# Patient Record
Sex: FEMALE | Race: WHITE | NOT HISPANIC OR LATINO | Employment: OTHER | URBAN - METROPOLITAN AREA
[De-identification: names, ages, dates, MRNs, and addresses within clinical notes are randomized per-mention and may not be internally consistent; named-entity substitution may affect disease eponyms.]

---

## 2020-11-25 PROCEDURE — U0004 COV-19 TEST NON-CDC HGH THRU: HCPCS | Performed by: NURSE PRACTITIONER

## 2021-06-22 ENCOUNTER — LAB (OUTPATIENT)
Dept: LAB | Facility: HOSPITAL | Age: 56
End: 2021-06-22

## 2021-06-22 ENCOUNTER — OFFICE VISIT (OUTPATIENT)
Dept: INTERNAL MEDICINE | Facility: CLINIC | Age: 56
End: 2021-06-22

## 2021-06-22 ENCOUNTER — HOSPITAL ENCOUNTER (OUTPATIENT)
Dept: GENERAL RADIOLOGY | Facility: HOSPITAL | Age: 56
Discharge: HOME | End: 2021-06-22

## 2021-06-22 VITALS
WEIGHT: 293 LBS | SYSTOLIC BLOOD PRESSURE: 148 MMHG | TEMPERATURE: 97.8 F | DIASTOLIC BLOOD PRESSURE: 88 MMHG | BODY MASS INDEX: 44.41 KG/M2 | RESPIRATION RATE: 20 BRPM | HEIGHT: 68 IN | OXYGEN SATURATION: 96 % | HEART RATE: 63 BPM

## 2021-06-22 DIAGNOSIS — M25.561 ACUTE PAIN OF RIGHT KNEE: Primary | ICD-10-CM

## 2021-06-22 DIAGNOSIS — F50.81 BINGE EATING DISORDER: ICD-10-CM

## 2021-06-22 DIAGNOSIS — M25.551 HIP PAIN, RIGHT: ICD-10-CM

## 2021-06-22 DIAGNOSIS — R73.09 ABNORMAL GLUCOSE: ICD-10-CM

## 2021-06-22 DIAGNOSIS — R74.8 ELEVATED ALKALINE PHOSPHATASE LEVEL: ICD-10-CM

## 2021-06-22 DIAGNOSIS — Z91.89 AT HIGH RISK FOR BREAST CANCER: ICD-10-CM

## 2021-06-22 DIAGNOSIS — J30.9 ALLERGIC RHINITIS, UNSPECIFIED SEASONALITY, UNSPECIFIED TRIGGER: ICD-10-CM

## 2021-06-22 DIAGNOSIS — M25.561 ACUTE PAIN OF RIGHT KNEE: ICD-10-CM

## 2021-06-22 DIAGNOSIS — E78.2 MIXED HYPERLIPIDEMIA: ICD-10-CM

## 2021-06-22 DIAGNOSIS — F33.41 RECURRENT MAJOR DEPRESSIVE DISORDER, IN PARTIAL REMISSION (HCC): ICD-10-CM

## 2021-06-22 DIAGNOSIS — I10 ESSENTIAL HYPERTENSION: ICD-10-CM

## 2021-06-22 DIAGNOSIS — F41.9 ANXIETY: ICD-10-CM

## 2021-06-22 DIAGNOSIS — N28.9 ABNORMAL KIDNEY FUNCTION: ICD-10-CM

## 2021-06-22 DIAGNOSIS — R61 NIGHT SWEAT: ICD-10-CM

## 2021-06-22 DIAGNOSIS — E03.9 ACQUIRED HYPOTHYROIDISM: ICD-10-CM

## 2021-06-22 DIAGNOSIS — R19.7 DIARRHEA, UNSPECIFIED TYPE: ICD-10-CM

## 2021-06-22 PROBLEM — E53.8 VITAMIN B12 DEFICIENCY (NON ANEMIC): Status: ACTIVE | Noted: 2018-05-08

## 2021-06-22 PROBLEM — E66.01 MORBID OBESITY: Status: ACTIVE | Noted: 2017-01-30

## 2021-06-22 PROBLEM — F41.0 PANIC DISORDER: Status: ACTIVE | Noted: 2019-12-03

## 2021-06-22 PROBLEM — F50.819 BINGE EATING DISORDER: Status: ACTIVE | Noted: 2021-06-22

## 2021-06-22 PROBLEM — E78.5 HYPERLIPIDEMIA: Status: ACTIVE | Noted: 2017-01-30

## 2021-06-22 PROBLEM — E55.9 VITAMIN D DEFICIENCY: Status: ACTIVE | Noted: 2018-05-08

## 2021-06-22 PROBLEM — M54.16 LUMBAR RADICULOPATHY: Status: ACTIVE | Noted: 2017-01-30

## 2021-06-22 LAB
ALBUMIN SERPL-MCNC: 4.5 G/DL (ref 3.5–5.2)
ALBUMIN/GLOB SERPL: 1.7 G/DL
ALP SERPL-CCNC: 149 U/L (ref 39–117)
ALT SERPL W P-5'-P-CCNC: 22 U/L (ref 1–33)
ANION GAP SERPL CALCULATED.3IONS-SCNC: 10.6 MMOL/L (ref 5–15)
AST SERPL-CCNC: 20 U/L (ref 1–32)
BASOPHILS # BLD AUTO: 0.07 10*3/MM3 (ref 0–0.2)
BASOPHILS NFR BLD AUTO: 0.7 % (ref 0–1.5)
BILIRUB SERPL-MCNC: <0.2 MG/DL (ref 0–1.2)
BUN SERPL-MCNC: 11 MG/DL (ref 6–20)
BUN/CREAT SERPL: 8.8 (ref 7–25)
CALCIUM SPEC-SCNC: 9.3 MG/DL (ref 8.6–10.5)
CHLORIDE SERPL-SCNC: 106 MMOL/L (ref 98–107)
CHOLEST SERPL-MCNC: 222 MG/DL (ref 0–200)
CO2 SERPL-SCNC: 24.4 MMOL/L (ref 22–29)
CREAT SERPL-MCNC: 1.25 MG/DL (ref 0.57–1)
DEPRECATED RDW RBC AUTO: 46.1 FL (ref 37–54)
EOSINOPHIL # BLD AUTO: 0.21 10*3/MM3 (ref 0–0.4)
EOSINOPHIL NFR BLD AUTO: 2 % (ref 0.3–6.2)
ERYTHROCYTE [DISTWIDTH] IN BLOOD BY AUTOMATED COUNT: 14.4 % (ref 12.3–15.4)
GFR SERPL CREATININE-BSD FRML MDRD: 44 ML/MIN/1.73
GLOBULIN UR ELPH-MCNC: 2.7 GM/DL
GLUCOSE SERPL-MCNC: 107 MG/DL (ref 65–99)
HCT VFR BLD AUTO: 41 % (ref 34–46.6)
HDLC SERPL-MCNC: 36 MG/DL (ref 40–60)
HGB BLD-MCNC: 13.5 G/DL (ref 12–15.9)
IMM GRANULOCYTES # BLD AUTO: 0.05 10*3/MM3 (ref 0–0.05)
IMM GRANULOCYTES NFR BLD AUTO: 0.5 % (ref 0–0.5)
LDLC SERPL CALC-MCNC: 150 MG/DL (ref 0–100)
LDLC/HDLC SERPL: 4.07 {RATIO}
LYMPHOCYTES # BLD AUTO: 1.96 10*3/MM3 (ref 0.7–3.1)
LYMPHOCYTES NFR BLD AUTO: 19 % (ref 19.6–45.3)
MCH RBC QN AUTO: 28.8 PG (ref 26.6–33)
MCHC RBC AUTO-ENTMCNC: 32.9 G/DL (ref 31.5–35.7)
MCV RBC AUTO: 87.6 FL (ref 79–97)
MONOCYTES # BLD AUTO: 0.48 10*3/MM3 (ref 0.1–0.9)
MONOCYTES NFR BLD AUTO: 4.7 % (ref 5–12)
NEUTROPHILS NFR BLD AUTO: 7.54 10*3/MM3 (ref 1.7–7)
NEUTROPHILS NFR BLD AUTO: 73.1 % (ref 42.7–76)
NRBC BLD AUTO-RTO: 0 /100 WBC (ref 0–0.2)
PLATELET # BLD AUTO: 382 10*3/MM3 (ref 140–450)
PMV BLD AUTO: 10.7 FL (ref 6–12)
POTASSIUM SERPL-SCNC: 4.6 MMOL/L (ref 3.5–5.2)
PROT SERPL-MCNC: 7.2 G/DL (ref 6–8.5)
RBC # BLD AUTO: 4.68 10*6/MM3 (ref 3.77–5.28)
SODIUM SERPL-SCNC: 141 MMOL/L (ref 136–145)
TRIGL SERPL-MCNC: 198 MG/DL (ref 0–150)
TSH SERPL DL<=0.05 MIU/L-ACNC: 1.64 UIU/ML (ref 0.27–4.2)
VLDLC SERPL-MCNC: 36 MG/DL (ref 5–40)
WBC # BLD AUTO: 10.31 10*3/MM3 (ref 3.4–10.8)

## 2021-06-22 PROCEDURE — 36415 COLL VENOUS BLD VENIPUNCTURE: CPT

## 2021-06-22 PROCEDURE — 80061 LIPID PANEL: CPT | Performed by: PHYSICIAN ASSISTANT

## 2021-06-22 PROCEDURE — 99214 OFFICE O/P EST MOD 30 MIN: CPT | Performed by: PHYSICIAN ASSISTANT

## 2021-06-22 PROCEDURE — 73560 X-RAY EXAM OF KNEE 1 OR 2: CPT

## 2021-06-22 PROCEDURE — 80053 COMPREHEN METABOLIC PANEL: CPT | Performed by: PHYSICIAN ASSISTANT

## 2021-06-22 PROCEDURE — 73502 X-RAY EXAM HIP UNI 2-3 VIEWS: CPT

## 2021-06-22 PROCEDURE — 85025 COMPLETE CBC W/AUTO DIFF WBC: CPT | Performed by: PHYSICIAN ASSISTANT

## 2021-06-22 PROCEDURE — 84443 ASSAY THYROID STIM HORMONE: CPT | Performed by: PHYSICIAN ASSISTANT

## 2021-06-22 PROCEDURE — 82977 ASSAY OF GGT: CPT

## 2021-06-22 RX ORDER — LEVOTHYROXINE SODIUM 0.15 MG/1
150 TABLET ORAL DAILY
Qty: 90 TABLET | Refills: 2 | Status: SHIPPED | OUTPATIENT
Start: 2021-06-22 | End: 2021-12-15 | Stop reason: SDUPTHER

## 2021-06-22 RX ORDER — FLUTICASONE PROPIONATE 50 MCG
2 SPRAY, SUSPENSION (ML) NASAL DAILY
COMMUNITY

## 2021-06-22 RX ORDER — VENLAFAXINE HYDROCHLORIDE 150 MG/1
CAPSULE, EXTENDED RELEASE ORAL
COMMUNITY
End: 2022-10-19 | Stop reason: SDUPTHER

## 2021-06-22 RX ORDER — PROPRANOLOL HYDROCHLORIDE 10 MG/1
10 TABLET ORAL 2 TIMES DAILY
COMMUNITY

## 2021-06-22 RX ORDER — PROPRANOLOL HYDROCHLORIDE 160 MG/1
160 CAPSULE, EXTENDED RELEASE ORAL
Qty: 90 EACH | Refills: 2 | Status: SHIPPED | OUTPATIENT
Start: 2021-06-22 | End: 2021-12-13 | Stop reason: SDUPTHER

## 2021-06-22 NOTE — PROGRESS NOTES
Chief Complaint  Establish Care, Anxiety (anxiety / depression ), Joint Pain in lower extremities , and Labs Only (night sweats, diarreah and nausea)    Subjective          History of Present Illness  Jessica Ugalde presents to [facility] to establish care.  Right Knee Pain:  Has been going on for the last 4 weeks, does not recall injuring her knee, no twisting steps or falls. Does not swell up or turn red. No fhx of RA but has fhx of OA. Has starting having hip pain in the right hip now as well. Having difficulty lifting her leg to put on clothing, difficulty with walking up stairs. Has hx of mild lower back pain with right sided sciatica but this pain feels different.     Diarrhea:  Has had it for a month now. Wakes up feeling nauseated and will have 3-4 episodes of diarrhea. No vomiting but has decreased appetite. Sx ease off through the day, no constipation. Has stomach cramps with it. No blood in stools or fevers. Last colonoscopy was 10 yrs ago. Cousin dx with colon CA in mid 30s.    Night sweats:  Waking up drenched a few nights most nights for the last 3 weeks, prior to that they were sporadically. No hot flashes during the day.  Had hysterectomy in 1999, was put on hormone therapy but stopped them due to fhx of breast CA. Does not have gyn, does not need paps, hysterectomy was complete.    Fhx Breast CA:  Last mammogram late last year, was normal, not due for another one yet.     Anx/Dep:  Takes propranolol 10mg prn for anxiety. Sees therapist through [facility] through  and sees a  there. Has PTSD and dep, anx, binge eating d/o. Taking Vyvanse for binge eating but not sure if she feels like it is helping. Sees therapist every week and Psych Dr monthly. Takes bupropion, has helped depression.  Is on remeron for depression and insomnia. Takes effexor as well. Psych manages her psych meds, Dr Chela Gallagher.    Hypothyroid:  Has been well controled on synthroid 150 for a  long time. Last TSH in the last year was normal.     HTN:  Controlled on propranolol LA 160mg, checks BP at home.  No CP/SOA. No hx of cardiac work up before, no echo/stress test. PGF  age 65 of AMI.       Review of Systems   Constitutional: Negative for fatigue, fever and unexpected weight loss.   Respiratory: Negative for cough, shortness of breath and wheezing.    Cardiovascular: Negative for chest pain and palpitations.   Gastrointestinal: Positive for diarrhea and nausea. Negative for abdominal pain, blood in stool, constipation, vomiting, GERD and indigestion.   Musculoskeletal: Negative for arthralgias, back pain, joint swelling and myalgias.   Skin: Negative for rash.   Neurological: Positive for numbness. Negative for dizziness, syncope, headache and confusion.   Psychiatric/Behavioral: Positive for sleep disturbance, depressed mood and stress. Negative for suicidal ideas. The patient is nervous/anxious.        The following portions of the patient's history were reviewed and updated as appropriate: allergies, current medications, past family history, past medical history, past social history, past surgical history and problem list.    Allergies   Allergen Reactions   • Fish-Derived Products Itching   • Lortab [Hydrocodone-Acetaminophen] Itching   • Morphine GI Intolerance   • Shellfish-Derived Products Itching     Current Outpatient Medications on File Prior to Visit   Medication Sig Dispense Refill   • buPROPion XL (FORFIVO XL) 450 MG 24 hr tablet bupropion HCl  mg 24 hr tablet, extended release   Take 1 tablet every day by oral route.     • calcium citrate-vitamin d (CITRACAL) 200-250 MG-UNIT tablet tablet Take  by mouth Daily.     • fluticasone (FLONASE) 50 MCG/ACT nasal spray 2 sprays into the nostril(s) as directed by provider Daily.     • lisdexamfetamine (Vyvanse) 30 MG capsule Take 30 mg by mouth Every Morning     • mirtazapine (REMERON) 15 MG tablet mirtazapine 15 mg tablet   Take 1 tab  nightly x1 week, then take 2 tabs (30 mg) nightly thereafter     • propranolol (INDERAL) 10 MG tablet Take 10 mg by mouth 2 (Two) Times a Day.     • venlafaxine XR (EFFEXOR-XR) 150 MG 24 hr capsule venlafaxine  mg capsule,extended release 24 hr   Take 1 capsule every day by oral route.     • [DISCONTINUED] levothyroxine (SYNTHROID, LEVOTHROID) 150 MCG tablet levothyroxine 150 mcg tablet   TAKE 1 TABLET BY MOUTH EVERY DAY     • [DISCONTINUED] propranolol LA (INDERAL LA) 160 MG 24 hr capsule propranolol  mg capsule,24 hr,extended release   Take 1 capsule every day by oral route.     • [DISCONTINUED] budesonide (PULMICORT) 180 MCG/ACT inhaler Inhale 2 puffs 2 (Two) Times a Day for 7 days. 1 inhaler 0     No current facility-administered medications on file prior to visit.     New Medications Ordered This Visit   Medications   • levothyroxine (SYNTHROID, LEVOTHROID) 150 MCG tablet     Sig: Take 1 tablet by mouth Daily.     Dispense:  90 tablet     Refill:  2   • propranolol LA (INDERAL LA) 160 MG 24 hr capsule     Sig: Take 1 capsule by mouth Daily.     Dispense:  90 each     Refill:  2       Past Medical History:   Diagnosis Date   • Anxiety    • Depression    • HL (hearing loss)    • Hyperlipidemia    • Hypertension    • Hypothyroidism    • Neuropathy       Past Surgical History:   Procedure Laterality Date   • CHOLECYSTECTOMY     • HYSTERECTOMY     • HYSTERECTOMY     • TONSILLECTOMY     • TUBAL ABDOMINAL LIGATION     • ULNAR NERVE TRANSPOSITION     • UMBILICAL HERNIA REPAIR        Family History   Problem Relation Age of Onset   • Arthritis Mother    • Breast cancer Mother    • Hypertension Mother    • Hyperlipidemia Mother    • Thyroid disease Mother    • Arthritis Father    • Hypertension Father    • Kidney disease Father    • Mental illness Father    • Hypertension Sister    • Hyperlipidemia Sister    • Kidney disease Sister    • Mental illness Sister    • Obesity Sister    • Osteoporosis Sister    •  "Thyroid disease Sister    • Hypertension Brother    • Obesity Brother    • Tuberculosis Maternal Grandmother    • Mental illness Paternal Grandmother    • Stroke Paternal Grandmother    • Heart attack Paternal Grandfather    • Colon cancer Cousin       Social History     Socioeconomic History   • Marital status:      Spouse name: Not on file   • Number of children: Not on file   • Years of education: Not on file   • Highest education level: Not on file   Tobacco Use   • Smoking status: Never Smoker   • Smokeless tobacco: Never Used   Substance and Sexual Activity   • Alcohol use: Never   • Drug use: Never        Objective   Vital Signs:   Vitals:    06/22/21 0912   BP: 148/88   Pulse: 63   Resp: 20   Temp: 97.8 °F (36.6 °C)   TempSrc: Temporal   SpO2: 96%   Weight: (!) 143 kg (315 lb 12.8 oz)   Height: 172.7 cm (68\")   PainSc:   7   PainLoc: Knee      Body mass index is 48.02 kg/m².  Physical Exam  Vitals reviewed.   Constitutional:       General: She is not in acute distress.     Appearance: Normal appearance.   HENT:      Head: Normocephalic and atraumatic.   Eyes:      General: No scleral icterus.     Extraocular Movements: Extraocular movements intact.      Conjunctiva/sclera: Conjunctivae normal.   Cardiovascular:      Rate and Rhythm: Normal rate and regular rhythm.      Heart sounds: Normal heart sounds. No murmur heard.     Pulmonary:      Effort: Pulmonary effort is normal. No respiratory distress.      Breath sounds: Normal breath sounds. No stridor. No wheezing or rhonchi.   Abdominal:      General: Bowel sounds are normal. There is no distension.      Tenderness: There is no abdominal tenderness. There is no right CVA tenderness or left CVA tenderness.   Musculoskeletal:         General: Tenderness (right knee) present. No signs of injury.      Cervical back: Normal range of motion and neck supple.      Right lower leg: No edema.      Left lower leg: No edema.   Skin:     General: Skin is warm and " dry.      Coloration: Skin is not jaundiced.   Neurological:      General: No focal deficit present.      Mental Status: She is alert and oriented to person, place, and time.      Gait: Gait normal.   Psychiatric:         Mood and Affect: Mood normal.         Behavior: Behavior normal.          Result Review :                   Assessment and Plan    Diagnoses and all orders for this visit:    1. Acute pain of right knee (Primary)  Assessment & Plan:  Will check xrays, refer to ortho.    Orders:  -     XR Knee 1 or 2 View Right; Future  -     Ambulatory Referral to Orthopedic Surgery    2. Hip pain, right  Assessment & Plan:  Check xrays, refer to ortho    Orders:  -     XR Hip With or Without Pelvis 2 - 3 View Right; Future  -     Ambulatory Referral to Orthopedic Surgery    3. Night sweat  Assessment & Plan:  Check labs, refer to GI for eval due to change in bowel habits, fhx of colon CA in cousin age 30s.      4. Diarrhea, unspecified type  Assessment & Plan:  Refer to GI, consider otc imodium for relief of sx. Check labs.     Orders:  -     Ambulatory Referral to Gastroenterology  -     Comprehensive Metabolic Panel; Future  -     CBC Auto Differential; Future    5. At high risk for breast cancer  Assessment & Plan:  Get yearly mammograms      6. Acquired hypothyroidism  Assessment & Plan:  Check TSH, refill synthroid    Orders:  -     TSH Rfx On Abnormal To Free T4; Future  -     levothyroxine (SYNTHROID, LEVOTHROID) 150 MCG tablet; Take 1 tablet by mouth Daily.  Dispense: 90 tablet; Refill: 2    7. Recurrent major depressive disorder, in partial remission (CMS/HCC)  Assessment & Plan:  Patient's depression is recurrent and is moderate without psychosis. Their depression is currently in partial remission and the condition is improving with treatment. This will be reassessed in 3 months. F/U as described:patient will continue current medication therapy. Cont to see [facility] psych for management of  depression/anxiety/PTSD, cont current meds as prescribed.       8. Essential hypertension  Assessment & Plan:  Hypertension is improving with treatment.  Continue current treatment regimen.  Blood pressure will be reassessed in 2 months. Cont propranolol . Pt reports BP a little high today d/t nervous about visit. It is 120/80 at home.    Orders:  -     propranolol LA (INDERAL LA) 160 MG 24 hr capsule; Take 1 capsule by mouth Daily.  Dispense: 90 each; Refill: 2    9. Mixed hyperlipidemia  Assessment & Plan:  Lipid abnormalities are improving with lifestyle modifications.  Nutritional counseling was provided.  Lipids will be reassessed in 3 months. Will check fasting lipids today    Orders:  -     Lipid Panel; Future    10. Binge eating disorder    11. Anxiety    12. Allergic rhinitis, unspecified seasonality, unspecified trigger        Follow Up   Return in about 2 months (around 8/22/2021).    Follow up if symptoms worsen or persist or has new or concerning symptoms, go to ER for severe symptoms.   Reviewed common medication effects and side effects and to report side effects immediately, the patient expressed good understanding.  Encouraged medication compliance and the importance of keeping scheduled follow up appointments with me and any other providers  If labs or images are ordered we will contact you with the results within the next week.  If you have not heard from us after a week please call our office to inquire about the results.   Patient was given instructions and counseling regarding her condition or for health maintenance advice. Please see specific information pulled into the AVS if appropriate.     Elsa Warner PA-C    * Please note that portions of this note may have been completed with a voice recognition program. Efforts were made to edit the dictation but occasionally words are erroneously transcribed.

## 2021-06-22 NOTE — ASSESSMENT & PLAN NOTE
Patient's depression is recurrent and is moderate without psychosis. Their depression is currently in partial remission and the condition is improving with treatment. This will be reassessed in 3 months. F/U as described:patient will continue current medication therapy. Cont to see [facility] psych for management of depression/anxiety/PTSD, cont current meds as prescribed.

## 2021-06-22 NOTE — ASSESSMENT & PLAN NOTE
Lipid abnormalities are improving with lifestyle modifications.  Nutritional counseling was provided.  Lipids will be reassessed in 3 months. Will check fasting lipids today

## 2021-06-22 NOTE — ASSESSMENT & PLAN NOTE
Check labs, refer to GI for eval due to change in bowel habits, fhx of colon CA in cousin age 30s.

## 2021-06-22 NOTE — ASSESSMENT & PLAN NOTE
Hypertension is improving with treatment.  Continue current treatment regimen.  Blood pressure will be reassessed in 2 months. Cont propranolol . Pt reports BP a little high today d/t nervous about visit. It is 120/80 at home.

## 2021-06-23 LAB — GGT SERPL-CCNC: 30 U/L (ref 5–36)

## 2021-06-25 ENCOUNTER — OFFICE VISIT (OUTPATIENT)
Dept: ORTHOPEDIC SURGERY | Facility: CLINIC | Age: 56
End: 2021-06-25

## 2021-06-25 ENCOUNTER — TELEPHONE (OUTPATIENT)
Dept: INTERNAL MEDICINE | Facility: CLINIC | Age: 56
End: 2021-06-25

## 2021-06-25 VITALS
BODY MASS INDEX: 44.41 KG/M2 | HEIGHT: 68 IN | DIASTOLIC BLOOD PRESSURE: 82 MMHG | SYSTOLIC BLOOD PRESSURE: 142 MMHG | HEART RATE: 57 BPM | WEIGHT: 293 LBS

## 2021-06-25 DIAGNOSIS — E66.01 CLASS 3 SEVERE OBESITY DUE TO EXCESS CALORIES WITHOUT SERIOUS COMORBIDITY WITH BODY MASS INDEX (BMI) OF 45.0 TO 49.9 IN ADULT (HCC): ICD-10-CM

## 2021-06-25 DIAGNOSIS — M17.11 PRIMARY OSTEOARTHRITIS OF RIGHT KNEE: Primary | ICD-10-CM

## 2021-06-25 PROCEDURE — 20610 DRAIN/INJ JOINT/BURSA W/O US: CPT | Performed by: ORTHOPAEDIC SURGERY

## 2021-06-25 PROCEDURE — 99204 OFFICE O/P NEW MOD 45 MIN: CPT | Performed by: ORTHOPAEDIC SURGERY

## 2021-06-25 RX ORDER — LIDOCAINE HYDROCHLORIDE 10 MG/ML
3 INJECTION, SOLUTION EPIDURAL; INFILTRATION; INTRACAUDAL; PERINEURAL
Status: COMPLETED | OUTPATIENT
Start: 2021-06-25 | End: 2021-06-25

## 2021-06-25 RX ORDER — TRIAMCINOLONE ACETONIDE 40 MG/ML
80 INJECTION, SUSPENSION INTRA-ARTICULAR; INTRAMUSCULAR
Status: COMPLETED | OUTPATIENT
Start: 2021-06-25 | End: 2021-06-25

## 2021-06-25 RX ORDER — BUPIVACAINE HYDROCHLORIDE 2.5 MG/ML
3 INJECTION, SOLUTION EPIDURAL; INFILTRATION; INTRACAUDAL
Status: COMPLETED | OUTPATIENT
Start: 2021-06-25 | End: 2021-06-25

## 2021-06-25 RX ADMIN — LIDOCAINE HYDROCHLORIDE 3 ML: 10 INJECTION, SOLUTION EPIDURAL; INFILTRATION; INTRACAUDAL; PERINEURAL at 12:09

## 2021-06-25 RX ADMIN — BUPIVACAINE HYDROCHLORIDE 3 ML: 2.5 INJECTION, SOLUTION EPIDURAL; INFILTRATION; INTRACAUDAL at 12:09

## 2021-06-25 RX ADMIN — TRIAMCINOLONE ACETONIDE 80 MG: 40 INJECTION, SUSPENSION INTRA-ARTICULAR; INTRAMUSCULAR at 12:09

## 2021-06-25 NOTE — PROGRESS NOTES
Orthopaedic Clinic Note: Knee New Patient    Chief Complaint   Patient presents with   • Right Knee - Pain        HPI  Consult from: Elsa Warner PA-C    Jessica Ugalde is a 55 y.o. female who presents with right knee pain for 5 week(s). Onset atraumatic and gradual in nature. Pain is localized to the entire knee (globally) and is a 6/10 on the pain scale. Pain is described as aching and stabbing. Associated symptoms include pain. The pain is worse with walking, standing, sitting, climbing stairs, sleeping, working, leisure, lying on affected side, rising from seated position and any movement of the joint; pain medication and/or NSAID make it better. Previous treatments have included: NSAIDS since symptom onset. Although some transient relief was reported with these interventions, these conservative measures have failed and symptoms have persisted. The patient is limited in daily activities and has had a significant decrease in quality of life as a result. She denies fevers, chills, or constitutional symptoms.  She is overweight with a BMI 47.09.    I have reviewed the following portions of the patient's history:History of Present Illness    Past Medical History:   Diagnosis Date   • Anxiety    • Depression    • HL (hearing loss)    • Hyperlipidemia    • Hypertension    • Hypothyroidism    • Neuropathy       Past Surgical History:   Procedure Laterality Date   • CHOLECYSTECTOMY     • HYSTERECTOMY     • HYSTERECTOMY     • TONSILLECTOMY     • TUBAL ABDOMINAL LIGATION     • ULNAR NERVE TRANSPOSITION     • UMBILICAL HERNIA REPAIR        Family History   Problem Relation Age of Onset   • Arthritis Mother    • Breast cancer Mother    • Hypertension Mother    • Hyperlipidemia Mother    • Thyroid disease Mother    • Arthritis Father    • Hypertension Father    • Kidney disease Father    • Mental illness Father    • Hypertension Sister    • Hyperlipidemia Sister    • Kidney disease Sister    • Mental illness Sister     • Obesity Sister    • Osteoporosis Sister    • Thyroid disease Sister    • Hypertension Brother    • Obesity Brother    • Tuberculosis Maternal Grandmother    • Mental illness Paternal Grandmother    • Stroke Paternal Grandmother    • Heart attack Paternal Grandfather    • Colon cancer Cousin      Social History     Socioeconomic History   • Marital status:      Spouse name: Not on file   • Number of children: Not on file   • Years of education: Not on file   • Highest education level: Not on file   Tobacco Use   • Smoking status: Never Smoker   • Smokeless tobacco: Never Used   Substance and Sexual Activity   • Alcohol use: Never   • Drug use: Never      Current Outpatient Medications on File Prior to Visit   Medication Sig Dispense Refill   • buPROPion XL (FORFIVO XL) 450 MG 24 hr tablet bupropion HCl  mg 24 hr tablet, extended release   Take 1 tablet every day by oral route.     • calcium citrate-vitamin d (CITRACAL) 200-250 MG-UNIT tablet tablet Take  by mouth Daily.     • fluticasone (FLONASE) 50 MCG/ACT nasal spray 2 sprays into the nostril(s) as directed by provider Daily.     • levothyroxine (SYNTHROID, LEVOTHROID) 150 MCG tablet Take 1 tablet by mouth Daily. 90 tablet 2   • lisdexamfetamine (Vyvanse) 30 MG capsule Take 30 mg by mouth Every Morning     • mirtazapine (REMERON) 15 MG tablet mirtazapine 15 mg tablet   Take 1 tab nightly x1 week, then take 2 tabs (30 mg) nightly thereafter     • propranolol (INDERAL) 10 MG tablet Take 10 mg by mouth 2 (Two) Times a Day.     • propranolol LA (INDERAL LA) 160 MG 24 hr capsule Take 1 capsule by mouth Daily. 90 each 2   • venlafaxine XR (EFFEXOR-XR) 150 MG 24 hr capsule venlafaxine  mg capsule,extended release 24 hr   Take 1 capsule every day by oral route.       No current facility-administered medications on file prior to visit.      Allergies   Allergen Reactions   • Fish-Derived Products Itching   • Lortab [Hydrocodone-Acetaminophen] Itching  "  • Morphine GI Intolerance   • Shellfish-Derived Products Itching        Review of Systems   Constitutional: Positive for appetite change.        Night sweats   HENT: Positive for hearing loss and tinnitus.    Eyes: Negative.    Respiratory: Negative.    Cardiovascular: Negative.    Gastrointestinal: Positive for abdominal pain, diarrhea and nausea.   Endocrine: Negative.    Genitourinary: Negative.    Musculoskeletal: Positive for arthralgias.   Skin: Negative.    Allergic/Immunologic: Positive for environmental allergies.   Neurological: Positive for weakness and numbness.   Hematological: Negative.    Psychiatric/Behavioral: Positive for decreased concentration. The patient is nervous/anxious.         The patient's Review of Systems was personally reviewed and confirmed as accurate.    The following portions of the patient's history were reviewed and updated as appropriate: allergies, current medications, past family history, past medical history, past social history, past surgical history and problem list.    Physical Exam  Blood pressure 142/82, pulse 57, height 172.7 cm (67.99\"), weight (!) 140 kg (309 lb 9.6 oz).    Body mass index is 47.09 kg/m².    GENERAL APPEARANCE: awake, alert & oriented x 3, in no acute distress, well developed, well nourished and obese  PSYCH: normal affect  LUNGS:  breathing nonlabored  EYES: sclera anicteric  CARDIOVASCULAR: palpable dorsalis pedis, palpable posterior tibial bilaterally. Capillary refill less than 2 seconds  EXTREMITIES: no clubbing, cyanosis  GAIT:  Antalgic            Right Lower Extremity Exam:   ----------  Hip Exam  ----------  FLEXION CONTRACTURE: None  FLEXION: 110 degrees  INTERNAL ROTATION: 20 degrees at 90 degrees of flexion   EXTERNAL ROTATION: 40 degrees at 90 degrees of flexion    PAIN WITH HIP MOTION: no  ----------  Knee Exam  ----------  ALIGNMENT: neutral, no varus or valgus deformity     RANGE OF MOTION:  Normal (0-120 degrees) with no extensor " lag or flexion contracture  LIGAMENTOUS STABILITY:   stable to varus and valgus stress at terminal extension and 30 degrees without any evidence of laxity     STRENGTH:  5/5 knee flexion, extension. 5/5 ankle dorsiflexion and plantarflexion.     PAIN WITH PALPATION: Globally tender to palpation   KNEE EFFUSION: Mild effusion  PAIN WITH KNEE ROM: Yes global  PATELLAR CREPITUS: yes, painful and symptomatic  SPECIAL EXAM FINDINGS:  Negative patellar compression    REFLEXES:  PATELLAR 2+/4  ACHILLES 2+/4    CLONUS: negative  STRAIGHT LEG TEST:   negative    SENSATION TO LIGHT TOUCH:  DEEP PERONEAL/SUPERFICIAL PERONEAL/SURAL/SAPHENOUS/TIBIAL:   intact    EDEMA:  no  ERYTHEMA:  no  WOUNDS/INCISIONS: no overlying skin problems.      Left Lower Extremity Exam:   ----------  Hip Exam  ----------  FLEXION CONTRACTURE: None  FLEXION: 110 degrees  INTERNAL ROTATION: 20 degrees at 90 degrees of flexion   EXTERNAL ROTATION: 40 degrees at 90 degrees of flexion    PAIN WITH HIP MOTION: no  ----------  Knee Exam  ----------  ALIGNMENT: neutral, no varus or valgus deformity     RANGE OF MOTION:  Normal (0-120 degrees) with no extensor lag or flexion contracture  LIGAMENTOUS STABILITY:   stable to varus and valgus stress at terminal extension and 30 degrees without any evidence of laxity     STRENGTH:  5/5 knee flexion, extension. 5/5 ankle dorsiflexion and plantarflexion.     PAIN WITH PALPATION: denies tenderness to palpation about the knee, denies medial or lateral joint line pain  KNEE EFFUSION: no  PAIN WITH KNEE ROM: no  PATELLAR CREPITUS: no  SPECIAL EXAM FINDINGS:  Negative patellar compression    REFLEXES:  PATELLAR 2+/4  ACHILLES 2+/4    CLONUS: negative  STRAIGHT LEG TEST:   negative    SENSATION TO LIGHT TOUCH:  DEEP PERONEAL/SUPERFICIAL PERONEAL/SURAL/SAPHENOUS/TIBIAL:   intact    EDEMA:  no  ERYTHEMA:  no  WOUNDS/INCISIONS: no overlying skin  problems.    ______________________________________________________________________  ______________________________________________________________________    RADIOGRAPHIC FINDINGS:   Right knee radiographs from 6/22/2021 are personally reviewed.  Radiographs demonstrate mild to moderate tricompartmental arthritic changes with slight medial compartment joint space narrowing and osteophyte formation.  No acute bony injury or fracture.    Assessment/Plan:   Diagnosis Plan   1. Primary osteoarthritis of right knee  Large Joint Arthrocentesis: R knee   2. Class 3 severe obesity due to excess calories without serious comorbidity with body mass index (BMI) of 45.0 to 49.9 in adult (CMS/[facility])       Patient is experiencing inflammation of the right knee.  She does have some mild arthritic changes as well as a knee effusion.  I discussed treatment options with her.  She is unable take oral anti-inflammatories secondary to decreased kidney function.  I discussed alternative treatments and she is agreeable to cortisone injection today.  She will follow-up in 6 weeks for repeat assessment.    Patient has an elevated BMI. The patient has been instructed on various weight loss avenues including diet, portion control, calorie restriction, low/no impact exercise, referral to weight loss management and/or bariatric surgery.  It was explained that weight loss can improve joint pain alone by decreasing the joint reaction forces.  For every pound of weight change, the knee and hip joints see a 4 to 5 fold change in pressure.  Given these options, the patient will proceed with low calorie diet and low impact exercise.    Procedure Note:  I discussed with the patient the potential benefits of performing a therapeutic injection of the right knee as well as potential risks including but not limited to infection, swelling, pain, bleeding, bruising, nerve/vessel damage, skin color changes, transient elevation in blood glucose levels, and fat  atrophy. After informed consent and verifying correct patient, procedure site, and type of procedure, the area was prepped with alcohol, ethyl chloride was used to numb the skin. Via the superior lateral approach, 3cc of 1% lidocaine, 3cc of 0.25% bupivicaine and 2 cc of 40mg/ml of Kenalog were injected into the right knee. The patient tolerated the procedure well. There were no complications. A sterile dressing was placed over the injection site.      Edward Keller MD  06/25/21  13:32 EDT

## 2021-06-25 NOTE — PROGRESS NOTES
Procedure   Large Joint Arthrocentesis: R knee  Date/Time: 6/25/2021 12:09 PM  Consent given by: patient  Site marked: site marked  Timeout: Immediately prior to procedure a time out was called to verify the correct patient, procedure, equipment, support staff and site/side marked as required   Supporting Documentation  Indications: pain   Procedure Details  Location: knee - R knee  Preparation: Patient was prepped and draped in the usual sterile fashion  Needle size: 23 G  Approach: anterolateral  Medications administered: 80 mg triamcinolone acetonide 40 MG/ML; 3 mL lidocaine PF 1% 1 %; 3 mL bupivacaine (PF) 0.25 %  Patient tolerance: patient tolerated the procedure well with no immediate complications

## 2021-06-25 NOTE — TELEPHONE ENCOUNTER
Called and spoke with patient, informed her of lab results. She verbalized understanding and had no further questions.

## 2021-06-25 NOTE — TELEPHONE ENCOUNTER
----- Message from Elsa Warner PA-C sent at 6/24/2021  4:33 PM EDT -----  Please call the patient regarding abnormal labs.   Her kidney function is decreased, has she had decreased kidney function before, can she bring us the last labs she had done? Avoid NSAIDs (motrin/aleve) with decreased kidney function. Her glucose was slightly elevated, cholesterol was elevated. Thyroid and blood cell counts were good.   Her xrays looked ok, minimal degenerative changes. She has an appt with ortho on Friday.   I would like to repeat her labs in 2 weeks to check on kidneys and make sure this is a chronic not acute problem. Please sched appt for pt.

## 2021-06-28 DIAGNOSIS — Z12.11 SCREENING FOR COLON CANCER: Primary | ICD-10-CM

## 2021-08-12 ENCOUNTER — TELEPHONE (OUTPATIENT)
Dept: ORTHOPEDIC SURGERY | Facility: CLINIC | Age: 56
End: 2021-08-12

## 2021-09-15 ENCOUNTER — TELEPHONE (OUTPATIENT)
Dept: INTERNAL MEDICINE | Facility: CLINIC | Age: 56
End: 2021-09-15

## 2021-09-15 ENCOUNTER — OFFICE VISIT (OUTPATIENT)
Dept: INTERNAL MEDICINE | Facility: CLINIC | Age: 56
End: 2021-09-15

## 2021-09-15 ENCOUNTER — LAB (OUTPATIENT)
Dept: LAB | Facility: HOSPITAL | Age: 56
End: 2021-09-15

## 2021-09-15 VITALS
HEIGHT: 68 IN | RESPIRATION RATE: 20 BRPM | WEIGHT: 293 LBS | SYSTOLIC BLOOD PRESSURE: 148 MMHG | BODY MASS INDEX: 44.41 KG/M2 | DIASTOLIC BLOOD PRESSURE: 82 MMHG | HEART RATE: 59 BPM | TEMPERATURE: 97.2 F | OXYGEN SATURATION: 96 %

## 2021-09-15 DIAGNOSIS — R91.1 COIN LESION OF LUNG: ICD-10-CM

## 2021-09-15 DIAGNOSIS — E78.5 HYPERLIPIDEMIA, UNSPECIFIED HYPERLIPIDEMIA TYPE: ICD-10-CM

## 2021-09-15 DIAGNOSIS — R73.09 ABNORMAL GLUCOSE: ICD-10-CM

## 2021-09-15 DIAGNOSIS — I10 ESSENTIAL HYPERTENSION: ICD-10-CM

## 2021-09-15 DIAGNOSIS — N28.9 ABNORMAL KIDNEY FUNCTION: ICD-10-CM

## 2021-09-15 DIAGNOSIS — R61 NIGHT SWEAT: ICD-10-CM

## 2021-09-15 DIAGNOSIS — R19.7 DIARRHEA, UNSPECIFIED TYPE: ICD-10-CM

## 2021-09-15 DIAGNOSIS — J01.91 ACUTE RECURRENT SINUSITIS, UNSPECIFIED LOCATION: ICD-10-CM

## 2021-09-15 DIAGNOSIS — R53.83 FATIGUE, UNSPECIFIED TYPE: ICD-10-CM

## 2021-09-15 DIAGNOSIS — Z91.89 AT RISK FOR SLEEP APNEA: ICD-10-CM

## 2021-09-15 DIAGNOSIS — Z12.11 SCREENING FOR COLON CANCER: ICD-10-CM

## 2021-09-15 DIAGNOSIS — R74.8 ELEVATED ALKALINE PHOSPHATASE LEVEL: ICD-10-CM

## 2021-09-15 DIAGNOSIS — E66.01 CLASS 3 SEVERE OBESITY DUE TO EXCESS CALORIES WITHOUT SERIOUS COMORBIDITY WITH BODY MASS INDEX (BMI) OF 45.0 TO 49.9 IN ADULT (HCC): Primary | ICD-10-CM

## 2021-09-15 PROBLEM — E66.813 CLASS 3 SEVERE OBESITY DUE TO EXCESS CALORIES WITHOUT SERIOUS COMORBIDITY WITH BODY MASS INDEX (BMI) OF 45.0 TO 49.9 IN ADULT: Status: ACTIVE | Noted: 2017-01-30

## 2021-09-15 LAB
25(OH)D3 SERPL-MCNC: 35.7 NG/ML
ALBUMIN SERPL-MCNC: 3.9 G/DL (ref 3.5–5.2)
ALBUMIN/GLOB SERPL: 1.2 G/DL
ALP SERPL-CCNC: 155 U/L (ref 39–117)
ALT SERPL W P-5'-P-CCNC: 19 U/L (ref 1–33)
ANION GAP SERPL CALCULATED.3IONS-SCNC: 13 MMOL/L (ref 5–15)
AST SERPL-CCNC: 25 U/L (ref 1–32)
BASOPHILS # BLD AUTO: 0.07 10*3/MM3 (ref 0–0.2)
BASOPHILS NFR BLD AUTO: 0.7 % (ref 0–1.5)
BILIRUB SERPL-MCNC: 0.2 MG/DL (ref 0–1.2)
BUN SERPL-MCNC: 10 MG/DL (ref 6–20)
BUN/CREAT SERPL: 9.3 (ref 7–25)
CALCIUM SPEC-SCNC: 9.5 MG/DL (ref 8.6–10.5)
CHLORIDE SERPL-SCNC: 104 MMOL/L (ref 98–107)
CO2 SERPL-SCNC: 22 MMOL/L (ref 22–29)
CREAT SERPL-MCNC: 1.07 MG/DL (ref 0.57–1)
DEPRECATED RDW RBC AUTO: 45.5 FL (ref 37–54)
EOSINOPHIL # BLD AUTO: 0.2 10*3/MM3 (ref 0–0.4)
EOSINOPHIL NFR BLD AUTO: 1.9 % (ref 0.3–6.2)
ERYTHROCYTE [DISTWIDTH] IN BLOOD BY AUTOMATED COUNT: 14.1 % (ref 12.3–15.4)
GFR SERPL CREATININE-BSD FRML MDRD: 53 ML/MIN/1.73
GLOBULIN UR ELPH-MCNC: 3.3 GM/DL
GLUCOSE SERPL-MCNC: 96 MG/DL (ref 65–99)
HBA1C MFR BLD: 5.8 % (ref 4.8–5.6)
HCT VFR BLD AUTO: 41.4 % (ref 34–46.6)
HGB BLD-MCNC: 13.3 G/DL (ref 12–15.9)
IMM GRANULOCYTES # BLD AUTO: 0.03 10*3/MM3 (ref 0–0.05)
IMM GRANULOCYTES NFR BLD AUTO: 0.3 % (ref 0–0.5)
LYMPHOCYTES # BLD AUTO: 2.03 10*3/MM3 (ref 0.7–3.1)
LYMPHOCYTES NFR BLD AUTO: 19.5 % (ref 19.6–45.3)
MCH RBC QN AUTO: 28.8 PG (ref 26.6–33)
MCHC RBC AUTO-ENTMCNC: 32.1 G/DL (ref 31.5–35.7)
MCV RBC AUTO: 89.6 FL (ref 79–97)
MONOCYTES # BLD AUTO: 0.53 10*3/MM3 (ref 0.1–0.9)
MONOCYTES NFR BLD AUTO: 5.1 % (ref 5–12)
NEUTROPHILS NFR BLD AUTO: 7.53 10*3/MM3 (ref 1.7–7)
NEUTROPHILS NFR BLD AUTO: 72.5 % (ref 42.7–76)
NRBC BLD AUTO-RTO: 0 /100 WBC (ref 0–0.2)
PLATELET # BLD AUTO: 367 10*3/MM3 (ref 140–450)
PMV BLD AUTO: 10.8 FL (ref 6–12)
POTASSIUM SERPL-SCNC: 4.5 MMOL/L (ref 3.5–5.2)
PROT SERPL-MCNC: 7.2 G/DL (ref 6–8.5)
RBC # BLD AUTO: 4.62 10*6/MM3 (ref 3.77–5.28)
SODIUM SERPL-SCNC: 139 MMOL/L (ref 136–145)
WBC # BLD AUTO: 10.39 10*3/MM3 (ref 3.4–10.8)

## 2021-09-15 PROCEDURE — 82306 VITAMIN D 25 HYDROXY: CPT | Performed by: PHYSICIAN ASSISTANT

## 2021-09-15 PROCEDURE — 99214 OFFICE O/P EST MOD 30 MIN: CPT | Performed by: PHYSICIAN ASSISTANT

## 2021-09-15 PROCEDURE — 83036 HEMOGLOBIN GLYCOSYLATED A1C: CPT | Performed by: PHYSICIAN ASSISTANT

## 2021-09-15 PROCEDURE — 80053 COMPREHEN METABOLIC PANEL: CPT | Performed by: PHYSICIAN ASSISTANT

## 2021-09-15 PROCEDURE — 85025 COMPLETE CBC W/AUTO DIFF WBC: CPT | Performed by: PHYSICIAN ASSISTANT

## 2021-09-15 PROCEDURE — 36415 COLL VENOUS BLD VENIPUNCTURE: CPT

## 2021-09-15 RX ORDER — AMOXICILLIN 875 MG/1
875 TABLET, COATED ORAL 2 TIMES DAILY
Qty: 20 TABLET | Refills: 0 | Status: SHIPPED | OUTPATIENT
Start: 2021-09-15 | End: 2021-12-13

## 2021-09-15 RX ORDER — FAMOTIDINE 20 MG
TABLET ORAL
COMMUNITY

## 2021-09-15 NOTE — PROGRESS NOTES
Answers for HPI/ROS submitted by the patient on 9/14/2021  Please describe your symptoms.: I am returning for followup that I missed because of an insurance issue.  I was to return because of abnormal labs.  I am experiencing severe allergies that has progressed to sinus headache and pain behind eyes and cheeks. Intermittent ear fullness and pain. No fever.  Have you had these symptoms before?: Yes  How long have you been having these symptoms?: 5-7 days  Please list any medications you are currently taking for this condition.: Flonase and Sudafed 12 hour  Please describe any probable cause for these symptoms. : Allergies  What is the primary reason for your visit?: Other    Chief Complaint  Hip Pain (better with injection ), Knee Pain (better with injection ), Hypothyroidism (still having night sweats ), Hyperlipidemia, and Hypertension    Subjective          History of Present Illness  Jessica Ugalde presents to [facility] for   Sinusitis:  Has had increased sinus pressure in the last few days. She has had allergy sx over the last month or so, takes zyrtec and flonase regularly. No cough or fever. Has ear pain and pressure and nasal congestion. Not much runny nose. Mild sore throat. Gets sinus infection twice a year and this feels like sinus infection for her.     Right Knee and Hip Pain:  Saw ortho as dir and sx are improved with injections. Has f/u with them only if needed now.    Diarrhea:  Has had it for 3-4 months now. Wakes up feeling nauseated and will have 3-4 episodes of diarrhea. No vomiting but has decreased appetite. Sx ease off through the day, no constipation, will have formed BM twice a week but the rest is diarrhea. Has stomach cramps with it. No blood in stools or fevers. Last colonoscopy was 10 yrs ago. Cousin dx with colon CA in mid 30s. Does not wake up at night with diarrhea.  Rescheduled colonoscopy for Oct 1st.  No abd pain other than cramps prior to BM  and no GERD sx.     Night sweats:  Still waking up with soaking night sweats for the last 2 months. Before that it happened occasionally. No hot flashes during the day. Thought it might be due to thyroid but labs were normal last visit.   Had hysterectomy in , was put on hormone therapy but stopped them due to fhx of breast CA. Does not have gyn currently, does not need paps, hysterectomy was complete. Last mammogram normal last year, not due yet for next one.    Sleep Apnea concern:  She has daytime fatigue, possible snoring, and night sweats, occ edema. No witnessed apneas. Psych has suggested she get sleep study.    Anx/Dep:  Takes propranolol 10mg prn for anxiety. Sees therapist through [facility] through  and sees a Dr there. Has PTSD and dep, anx, binge eating d/o. Sees therapist every week and Psych Dr monthly. Takes bupropion, has helped depression.  Is on remeron for depression and insomnia but they are considering stopping that due to weight gain. Takes effexor as well  And has been well controlled on that medication for a long time. Psych manages her psych meds, Dr Chela Gallagher.    Hypothyroid:  Has been well controled on synthroid 150 for a long time. Lab Results       Component                Value               Date                       TSH                      1.640               2021              HTN:  Controlled on propranolol LA 160mg, checks BP at home sometimes and is not elevated.  No CP/SOA. No hx of cardiac work up before, no echo/stress test. PGF  age 65 of AMI. No significant edema, occ ankle swelling if eats extra salty foods. Thinks some of her elevated BP here is due to anxiety.  Takes propranolol quick acting 10 mg as needed for anxiety    HLD:  Had elevated cholesterol at last visit, has not worked much diet changes yet but has discussed it with her daughter and they are planning on working on diet changes going forward.  Would like to see nutritionist.      Review of  Systems   Constitutional: Positive for fatigue. Negative for fever and unexpected weight loss.   Respiratory: Negative for cough, shortness of breath and wheezing.    Cardiovascular: Negative for chest pain and palpitations.   Gastrointestinal: Positive for abdominal pain and diarrhea. Negative for constipation, nausea, vomiting and GERD.   Musculoskeletal: Positive for arthralgias.   Neurological: Negative for dizziness and headache.   Psychiatric/Behavioral: Positive for sleep disturbance. Negative for suicidal ideas and depressed mood. The patient is nervous/anxious.        The following portions of the patient's history were reviewed and updated as appropriate: allergies, current medications, past family history, past medical history, past social history, past surgical history and problem list.  Allergies   Allergen Reactions   • Fish-Derived Products Itching   • Lortab [Hydrocodone-Acetaminophen] Itching   • Morphine GI Intolerance   • Shellfish-Derived Products Itching     Current Outpatient Medications on File Prior to Visit   Medication Sig Dispense Refill   • buPROPion XL (FORFIVO XL) 450 MG 24 hr tablet bupropion HCl  mg 24 hr tablet, extended release   Take 1 tablet every day by oral route.     • fluticasone (FLONASE) 50 MCG/ACT nasal spray 2 sprays into the nostril(s) as directed by provider Daily.     • levothyroxine (SYNTHROID, LEVOTHROID) 150 MCG tablet Take 1 tablet by mouth Daily. 90 tablet 2   • mirtazapine (REMERON) 15 MG tablet mirtazapine 15 mg tablet   Take 1 tab nightly x1 week, then take 2 tabs (30 mg) nightly thereafter     • propranolol (INDERAL) 10 MG tablet Take 10 mg by mouth 2 (Two) Times a Day.     • propranolol LA (INDERAL LA) 160 MG 24 hr capsule Take 1 capsule by mouth Daily. 90 each 2   • venlafaxine XR (EFFEXOR-XR) 150 MG 24 hr capsule venlafaxine  mg capsule,extended release 24 hr   Take 1 capsule every day by oral route.     • Vitamin D, Cholecalciferol, 25 MCG (1000  "UT) capsule Take  by mouth.     • calcium citrate-vitamin d (CITRACAL) 200-250 MG-UNIT tablet tablet Take  by mouth Daily.       No current facility-administered medications on file prior to visit.       Social History     Tobacco Use   Smoking Status Never Smoker   Smokeless Tobacco Never Used        Objective   Vital Signs:   Vitals:    09/15/21 1045   BP: 148/82   Pulse: 59   Resp: 20   Temp: 97.2 °F (36.2 °C)   TempSrc: Temporal   SpO2: 96%   Weight: (!) 139 kg (307 lb)   Height: 172.7 cm (67.99\")   PainSc: 0-No pain      Physical Exam  Vitals reviewed.   Constitutional:       General: She is not in acute distress.     Appearance: Normal appearance.   HENT:      Head: Normocephalic and atraumatic.      Right Ear: Tympanic membrane, ear canal and external ear normal.      Left Ear: Tympanic membrane, ear canal and external ear normal.      Nose: Nose normal.      Mouth/Throat:      Mouth: Mucous membranes are moist.      Pharynx: No posterior oropharyngeal erythema.   Eyes:      General: No scleral icterus.     Extraocular Movements: Extraocular movements intact.      Conjunctiva/sclera: Conjunctivae normal.   Cardiovascular:      Rate and Rhythm: Normal rate and regular rhythm.      Heart sounds: Normal heart sounds. No murmur heard.     Pulmonary:      Effort: Pulmonary effort is normal. No respiratory distress.      Breath sounds: Normal breath sounds. No stridor. No wheezing or rhonchi.   Musculoskeletal:      Cervical back: Normal range of motion and neck supple.   Skin:     General: Skin is warm and dry.      Coloration: Skin is not jaundiced.   Neurological:      General: No focal deficit present.      Mental Status: She is alert and oriented to person, place, and time.      Gait: Gait normal.   Psychiatric:         Mood and Affect: Mood normal.         Behavior: Behavior normal.          Result Review :                New Medications Ordered This Visit   Medications   • amoxicillin (AMOXIL) 875 MG tablet "     Sig: Take 1 tablet by mouth 2 (Two) Times a Day.     Dispense:  20 tablet     Refill:  0          Assessment and Plan    Diagnoses and all orders for this visit:    1. Class 3 severe obesity due to excess calories without serious comorbidity with body mass index (BMI) of 45.0 to 49.9 in adult (CMS/[facility]) (Primary)  Assessment & Plan:  Patient's (Body mass index is 46.69 kg/m².) indicates that they are morbidly obese (BMI > 40 or > 35 with obesity - related health condition) with health conditions that include hypertension . Weight is unchanged. BMI is is above average; BMI management plan is completed. We discussed portion control and increasing exercise. Referral to nutritionist.     Orders:  -     Ambulatory Referral to Nutrition Services    2. Acute recurrent sinusitis, unspecified location  Assessment & Plan:  tx w/amox, f/u if sx worsen or persist.    Orders:  -     amoxicillin (AMOXIL) 875 MG tablet; Take 1 tablet by mouth 2 (Two) Times a Day.  Dispense: 20 tablet; Refill: 0    3. Fatigue, unspecified type  Assessment & Plan:  Refer for sleep study    Orders:  -     Ambulatory Referral to Sleep Medicine    4. Diarrhea, unspecified type  -     CBC w AUTO Differential; Future    5. Hyperlipidemia, unspecified hyperlipidemia type  Assessment & Plan:  Lipid abnormalities are improving with treatment.  Nutritional counseling was provided. and Pharmacotherapy as ordered.  Lipids will be reassessed in 3 months. Will recheck lipids today.    Orders:  -     Lipid Panel; Future    6. Night sweat  Assessment & Plan:  Check labs, keep GI appt for scope d/t change in bowel habits and fhx of colon CA      7. Essential hypertension  Assessment & Plan:  Hypertension is improving with treatment.  Continue current treatment regimen.  Blood pressure will be reassessed in 3 months.      8. At risk for sleep apnea  Assessment & Plan:  Refer for sleep study     Orders:  -     Ambulatory Referral to Sleep Medicine    9. [place]  lesion of lung  Assessment & Plan:  May need repeat CT will review notes        Follow Up   Return in about 3 months (around 12/15/2021).    Follow up if symptoms worsen or persist or has new or concerning symptoms, go to ER for severe symptoms.   Reviewed common medication effects and side effects and advised to report side effects immediately, the patient expressed good understanding.  Encouraged medication compliance and the importance of keeping scheduled follow up appointments with me and any other providers  If labs or images are ordered we will contact you with the results within the next week.  If you have not heard from us after a week please call our office to inquire about the results.   Patient was given instructions and counseling regarding her condition or for health maintenance advice. Please see specific information pulled into the AVS if appropriate.     Elsa Warner PA-C    * Please note that portions of this note may have been completed with a voice recognition program. Efforts were made to edit the dictation but occasionally words are erroneously transcribed.

## 2021-09-16 PROBLEM — Z91.89 AT RISK FOR SLEEP APNEA: Status: ACTIVE | Noted: 2021-09-16

## 2021-09-16 NOTE — ASSESSMENT & PLAN NOTE
Patient's (Body mass index is 46.69 kg/m².) indicates that they are morbidly obese (BMI > 40 or > 35 with obesity - related health condition) with health conditions that include hypertension . Weight is unchanged. BMI is is above average; BMI management plan is completed. We discussed portion control and increasing exercise. Referral to nutritionist.

## 2021-09-16 NOTE — ASSESSMENT & PLAN NOTE
Lipid abnormalities are improving with treatment.  Nutritional counseling was provided. and Pharmacotherapy as ordered.  Lipids will be reassessed in 3 months. Will recheck lipids today.

## 2021-09-23 ENCOUNTER — PATIENT MESSAGE (OUTPATIENT)
Dept: GASTROENTEROLOGY | Facility: CLINIC | Age: 56
End: 2021-09-23

## 2021-09-23 NOTE — TELEPHONE ENCOUNTER
From: Jessica Ugalde  To: Tam Sweet MD  Sent: 9/23/2021 10:19 AM EDT  Subject: Prescription Question    I have a colonoscopy scheduled for 10/01/2021. [facility] on [place], [place] has sent your office a prior authorization request for the prep tablets about a week ago. Vibha states as of today no authorization rec'd. I appreciate your efforts in getting this authorization and notifying [facility].

## 2021-09-23 NOTE — TELEPHONE ENCOUNTER
Pt was seen in the office and mentioned a lung nodule in 2015, which was not followed up on. Elsa asked me to call patient and ask her when her last CT was for the lung nodule. I thought the message was clear. Elsa also placed a note in the lab results for the same question.     Next time, I will make the message more clear so others can work on the messages as well.

## 2021-09-24 ENCOUNTER — TELEPHONE (OUTPATIENT)
Dept: INTERNAL MEDICINE | Facility: CLINIC | Age: 56
End: 2021-09-24

## 2021-09-24 NOTE — TELEPHONE ENCOUNTER
PN of results. She stated understanding.  She did have the CT follow up scan. It was improved.  She will get a copy of results from their portal and bring them in

## 2021-09-24 NOTE — TELEPHONE ENCOUNTER
----- Message from Elsa Warner PA-C sent at 9/21/2021  2:01 PM EDT -----  Labs overall look ok, your A1c is slightly elevated putting you in the early prediabetic range. Your kidney function is still decreased but improved from previous visit. We will continue to monitor your kidneys and recheck those labs at your follow up. Continue to avoid motrin, ibuprofen, aleve. Vitamin D is normal.   #Also please ask her if she ever got a f/u CT scan regarding a lesion on her lung from 2015.

## 2021-10-01 ENCOUNTER — OUTSIDE FACILITY SERVICE (OUTPATIENT)
Dept: GASTROENTEROLOGY | Facility: CLINIC | Age: 56
End: 2021-10-01

## 2021-10-01 PROCEDURE — 88305 TISSUE EXAM BY PATHOLOGIST: CPT | Performed by: INTERNAL MEDICINE

## 2021-10-01 PROCEDURE — 45380 COLONOSCOPY AND BIOPSY: CPT | Performed by: INTERNAL MEDICINE

## 2021-10-04 ENCOUNTER — LAB REQUISITION (OUTPATIENT)
Dept: LAB | Facility: HOSPITAL | Age: 56
End: 2021-10-04

## 2021-10-04 DIAGNOSIS — K64.8 OTHER HEMORRHOIDS: ICD-10-CM

## 2021-10-04 DIAGNOSIS — R19.7 DIARRHEA, UNSPECIFIED: ICD-10-CM

## 2021-10-04 DIAGNOSIS — R10.30 LOWER ABDOMINAL PAIN, UNSPECIFIED: ICD-10-CM

## 2021-10-04 DIAGNOSIS — Z83.71 FAMILY HISTORY OF COLONIC POLYPS: ICD-10-CM

## 2021-10-05 LAB
CYTO UR: NORMAL
LAB AP CASE REPORT: NORMAL
LAB AP CLINICAL INFORMATION: NORMAL
PATH REPORT.FINAL DX SPEC: NORMAL
PATH REPORT.GROSS SPEC: NORMAL

## 2021-10-06 ENCOUNTER — TELEPHONE (OUTPATIENT)
Dept: GASTROENTEROLOGY | Facility: CLINIC | Age: 56
End: 2021-10-06

## 2021-10-06 NOTE — TELEPHONE ENCOUNTER
I TRIED TO CALL MS [facility] TO GIVE BIOPSY RESULTS. NO ANSWER; LEFT VOICE MESSAGE. I WILL SEND RESULTS TO MY CHART AND THE MAIL.

## 2021-10-06 NOTE — TELEPHONE ENCOUNTER
----- Message from Tam Sweet MD sent at 10/5/2021  4:24 PM EDT -----  Let Ms. Ugalde know that the biopsies were negative for microscopic colitis.  If she is still experiencing some periods of diarrhea can proceed with a course of Xifaxan for irritable bowel syndrome.  The next colon examination would be in 5 years.

## 2021-10-14 ENCOUNTER — PATIENT MESSAGE (OUTPATIENT)
Dept: GASTROENTEROLOGY | Facility: CLINIC | Age: 56
End: 2021-10-14

## 2021-10-14 NOTE — TELEPHONE ENCOUNTER
From: Jessica Ugalde  To: Tam Sweet MD  Sent: 10/14/2021 10:46 AM EDT  Subject: Test Results Question    Dr. Sweet suggested Xifaxan if I am still having bouts of diarrhea, which I am. I would like to know the dosage and length of time I would take this medication. Should I anticipate my symptoms of diarrhea, cramping, loss of appetite to be alleviated by this medication altogether? Also, I have briefly looked at the side effects of Xifaxan. With the possibility of worse diarrhea, nausea and increased chance of C-Diff and more, do the benefits of this medication outweigh the side effects? Should I be taking a probiotic daily to improve gut health?

## 2021-10-15 ENCOUNTER — TELEPHONE (OUTPATIENT)
Dept: GASTROENTEROLOGY | Facility: CLINIC | Age: 56
End: 2021-10-15

## 2021-10-15 DIAGNOSIS — K58.0 IRRITABLE BOWEL SYNDROME WITH DIARRHEA: Primary | ICD-10-CM

## 2021-11-10 ENCOUNTER — HOSPITAL ENCOUNTER (OUTPATIENT)
Dept: NUTRITION | Facility: HOSPITAL | Age: 56
Setting detail: RECURRING SERIES
Discharge: HOME | End: 2021-11-10

## 2021-11-10 VITALS — HEIGHT: 68 IN | BODY MASS INDEX: 44.41 KG/M2 | WEIGHT: 293 LBS

## 2021-11-10 PROCEDURE — 97802 MEDICAL NUTRITION INDIV IN: CPT

## 2021-11-10 NOTE — CONSULTS
Adult Outpatient Nutrition  Assessment/PES    Patient Name:  Jessica Ugalde  YOB: 1965  MRN: [id number]    Assessment Date:  11/10/2021    Comments:      This medical referred consult was provided via phone as patient was unable to attend an in-office appointment today due to the COVID-19 crisis. Consent for treatment was given verbally. RD spent a total of 60 minutes with patient today.      Patient is a 55 year old female seen today for an initial nutrition visit. Patient shared she is seeking support to develop healthy lifestyle behaviors to prevent diabetes and chronic kidney disease. Per the patient's PCP, her most recent laboratory values on 9/15/21 showed slight decline in kidney function and pre-diabetes. Patient shared she has a PMH of Binge Eating Disorder, severe anxiety and depression, and severe diarrhea. Patient reported she recently took an 18 month leap of absence from work and is on disability due to mental health. Since this time, patient has started weekly therapy sessions. Patient shared she has struggled with binge eating since as young as 5 years old. She reported the onset of her diarrhea was about 4 months ago. She described the severity as several episodes of lose stools for 6-8 weeks street. Patient reported she had a colonoscopy done on 10/01/21 and was diagnosed with IBS-D. Since then, patient reported she started a probiotic and her bowel movements have decreased down to having diarrhea once daily. Patient stated the onset of her diarrhea was very random and very sudden. She reported she has not made any dietary changes in the past 18 months. Patient stated she is currently eating 1-2 meals daily. She stated she does not feel any hunger or have any desire to eat. Patient is hoping RD can support her in any of these areas today.    24-Hour Dietary Recall:  1:00 pm: large bowl of honey nut cheerios with milk  8:00 pm: 2 cups chicken noodle soup  Beverages: 20 fl oz  water  32 fl oz iced tea     Per dietary recall and interview, patient is not meeting overall nutrient needs. RD discussed the importance of eating consistent, balanced meals to promote a stable blood sugar and kidney function. RD explained how both under eating and over eating can promote unstable blood sugar levels and may reduce kidney function. We reviewed the different food groups and identified what foods contain protein, carbohydrates, and fat. RD used the plate method to demonstrate the components of a balanced meal. RD provided patient with several realistic meal and snack ideas. Patient stated she can see many areas of her diet that need improvement, and RD encouraged patient to work on change  at a sustainable pace. Patient stated a good first step for her would be to implementing a balanced breakfast. She was able to teach back concepts discussed by suggesting she could have a slice of toast with peanut butter or apple sauce with a hard boiled egg in the morning around 9:00 am. Patient stated she feels good about this first step. RD received permission to mail patient snack and meal ideas. RD encouraged patient to contact RD with any needs prior to scheduled follow up visit.     Goals:  1) Have a balanced breakfast 3 days per week.     Total of 60 minutes spent with patient on nutrition counseling. Education based on Academy of Nutrition and Dietetics guidelines. Patient was provided with RD's contact information. Follow up visit is scheduled for 12/15/21 at 10:00 am. Thank you for this referral.     General Info     Row Name 11/10/21 1403       Today's Session    Person(s) attending today's session Patient     Services Used Today? No       General Information    How Well Do You Speak English? very well    Preferred Language English    Are you able to read and write English? Yes    Lives With child(greyson), adult    Last grade of school completed 2 years college                 Anthropometrics   "   Row Name 11/10/21 1410 11/10/21 1404       Anthropometrics    Height 172.7 cm (67.99\") 172.7 cm (67.99\")    Weight -- 139 kg (307 lb)       Ideal Body Weight (IBW)    Ideal Body Weight (IBW) (kg) 64.13 64.13    % Ideal Body Weight -- 217.14       Body Mass Index (BMI)    BMI (kg/m2) -- 46.79               Nutritional Info/Activity     Row Name 11/10/21 1404       Nutritional Information    Have you had weight changes? Yes    Describe weight changes Patient stated she unintentionally lost 16 lbs in the past 6-8 weeks due to severe diarrhea    Have you tried to lose weight before? Yes    List programs tried, date, and success phentermine, protein shakes, low car and high protein diet - no long term success    What is your motivation to lose weight? imrpove health and be there for grend children    Food Allergies fish/shellfish    Supplemental Drinks/Foods/Additives Vitamin D3    History of eating disorder? Compulsive eating    What cultural diet influences are important for you to follow? none    Do you have difficulty chewing food? No    Functional Status able to prepare meals; able to purchase food    List any food cravings/trigger foods you have swwets (cereal, cookies, snack cakes)    Food Behaviors Stress eater; Comfort eater    How often do you eat out and where? 1-2 times per week - Mexican restaurant, [employer]    Do you use Food Assistance programs (WIC, food stamps, food bank)? no    Do you need information about Food Assistance programs? no    How many times do you drink milk per day? 2    How many times do you eat fruit per day? 1    How many times do you eat vegetables per day? 1    How many times do you drink juice per day? 0    How many times do you eat candy/chocolates per day? 1    How many times do you eat baked goods per day? --  1-2    How many times do you eat desserts per day? 1    How many times do you eat ice cream per day? --  2x/ month    How many times do you eat snack foods per day? --  2-3 " "   How many diet sodas do you drink per day? 0    How many regular sodas do you drink per day? --  2x/ week    How many times do you eat ethnic food per day? --  once weekly    How many times do you drink alcohol per day? 0    How many times do you have caffeine per day? 3    How many servings of artificial sweetner do you have per day? 0    How many meals do you eat each day? --  1-2    How many snacks do you eat each day? 0    What is the biggest challenge you have with your diet? Other (comment)  meal planning and prepping    What type of support do you currently use to help you with your health issues? none       Eating Environment    Eating environment Alone; Family       Physical Activity    Are you currently involved in an activity/exercise program?  No               Home Nutrition Report     Row Name 11/10/21 1404          Home Nutrition Report    Supplemental Drinks/Foods/Additives Vitamin D3                Estimated/Assessed Needs     Row Name 11/10/21 1410 11/10/21 1404       Calculation Measurements    Weight Used For Calculations 139 kg (307 lb) --    Height 172.7 cm (67.99\") 172.7 cm (67.99\")       Estimated/Assessed Needs    Additional Documentation Midway-St. Jeor Equation (Group) --       Midway-St. Jeor Equation    RMR (Midway-St. Jeor Equation) [id number] --    Midway-St. Jeor Activity Factors 1.2 --    Activity Factors (Midway-St. Jeor) [id number] --                 Labs/Tests/Procedures/Meds     Row Name 11/10/21 1410          Labs/Procedures/Meds    Lab Results Reviewed reviewed     Lab Results Comments (9-15-21): Hgb A1c was 5.8%                   Problem/Interventions:   Problem 1     Row Name 11/10/21 1412          Nutrition Diagnoses Problem 1    Problem 1 Overweight/Obesity     Etiology (related to) --  lifestyle     Signs/Symptoms (evidenced by) BMI     BMI Greater than 40                        Intervention Goal     Row Name 11/10/21 1412          Intervention " Goal    General Meet nutritional needs for age/condition; Improved nutrition related lab(s)     Weight Appropriate weight loss                    Education/Evaluation     Row Name 11/10/21 1413          Education    Education Advised regarding habits/behavior     Advised Regarding Habits/Behavior Snacks; Appropriate portions; Eating out; Eating pattern; Food choices; Meal planning; Food prep; Increased nutrient density            Monitor/Evaluation    Monitor PO intake; Pertinent labs; Weight     Education Follow-up Reinforce PRN                 Electronically signed by:  Fozia Nickerson RD  11/10/21 14:13 EST

## 2021-12-13 ENCOUNTER — OFFICE VISIT (OUTPATIENT)
Dept: INTERNAL MEDICINE | Facility: CLINIC | Age: 56
End: 2021-12-13

## 2021-12-13 ENCOUNTER — LAB (OUTPATIENT)
Dept: LAB | Facility: HOSPITAL | Age: 56
End: 2021-12-13

## 2021-12-13 VITALS
HEART RATE: 58 BPM | OXYGEN SATURATION: 98 % | SYSTOLIC BLOOD PRESSURE: 134 MMHG | DIASTOLIC BLOOD PRESSURE: 72 MMHG | TEMPERATURE: 97.1 F | WEIGHT: 293 LBS | HEIGHT: 68 IN | RESPIRATION RATE: 20 BRPM | BODY MASS INDEX: 44.41 KG/M2

## 2021-12-13 DIAGNOSIS — E03.9 ACQUIRED HYPOTHYROIDISM: ICD-10-CM

## 2021-12-13 DIAGNOSIS — R73.03 PREDIABETES: ICD-10-CM

## 2021-12-13 DIAGNOSIS — Z12.31 ENCOUNTER FOR SCREENING MAMMOGRAM FOR BREAST CANCER: ICD-10-CM

## 2021-12-13 DIAGNOSIS — E78.5 HYPERLIPIDEMIA, UNSPECIFIED HYPERLIPIDEMIA TYPE: ICD-10-CM

## 2021-12-13 DIAGNOSIS — N28.9 ABNORMAL KIDNEY FUNCTION: ICD-10-CM

## 2021-12-13 DIAGNOSIS — R21 RASH: ICD-10-CM

## 2021-12-13 DIAGNOSIS — Z23 NEED FOR VACCINATION: ICD-10-CM

## 2021-12-13 DIAGNOSIS — I10 ESSENTIAL HYPERTENSION: Primary | ICD-10-CM

## 2021-12-13 DIAGNOSIS — E66.01 CLASS 3 SEVERE OBESITY DUE TO EXCESS CALORIES WITHOUT SERIOUS COMORBIDITY WITH BODY MASS INDEX (BMI) OF 45.0 TO 49.9 IN ADULT (HCC): ICD-10-CM

## 2021-12-13 LAB
ALBUMIN SERPL-MCNC: 3.9 G/DL (ref 3.5–5.2)
ALBUMIN/GLOB SERPL: 1.3 G/DL
ALP SERPL-CCNC: 146 U/L (ref 39–117)
ALT SERPL W P-5'-P-CCNC: 17 U/L (ref 1–33)
ANION GAP SERPL CALCULATED.3IONS-SCNC: 12.7 MMOL/L (ref 5–15)
AST SERPL-CCNC: 21 U/L (ref 1–32)
BILIRUB SERPL-MCNC: 0.2 MG/DL (ref 0–1.2)
BUN SERPL-MCNC: 10 MG/DL (ref 6–20)
BUN/CREAT SERPL: 9.3 (ref 7–25)
CALCIUM SPEC-SCNC: 9.4 MG/DL (ref 8.6–10.5)
CHLORIDE SERPL-SCNC: 106 MMOL/L (ref 98–107)
CHOLEST SERPL-MCNC: 225 MG/DL (ref 0–200)
CO2 SERPL-SCNC: 25.3 MMOL/L (ref 22–29)
CREAT SERPL-MCNC: 1.07 MG/DL (ref 0.57–1)
EXPIRATION DATE: NORMAL
GFR SERPL CREATININE-BSD FRML MDRD: 53 ML/MIN/1.73
GLOBULIN UR ELPH-MCNC: 3 GM/DL
GLUCOSE SERPL-MCNC: 76 MG/DL (ref 65–99)
HBA1C MFR BLD: 5.9 %
HDLC SERPL-MCNC: 35 MG/DL (ref 40–60)
LDLC SERPL CALC-MCNC: 166 MG/DL (ref 0–100)
LDLC/HDLC SERPL: 4.67 {RATIO}
Lab: NORMAL
POTASSIUM SERPL-SCNC: 4.8 MMOL/L (ref 3.5–5.2)
PROT SERPL-MCNC: 6.9 G/DL (ref 6–8.5)
SODIUM SERPL-SCNC: 144 MMOL/L (ref 136–145)
TRIGL SERPL-MCNC: 133 MG/DL (ref 0–150)
TSH SERPL DL<=0.05 MIU/L-ACNC: 1.53 UIU/ML (ref 0.27–4.2)
VLDLC SERPL-MCNC: 24 MG/DL (ref 5–40)

## 2021-12-13 PROCEDURE — 90471 IMMUNIZATION ADMIN: CPT | Performed by: PHYSICIAN ASSISTANT

## 2021-12-13 PROCEDURE — 99214 OFFICE O/P EST MOD 30 MIN: CPT | Performed by: PHYSICIAN ASSISTANT

## 2021-12-13 PROCEDURE — 83036 HEMOGLOBIN GLYCOSYLATED A1C: CPT | Performed by: PHYSICIAN ASSISTANT

## 2021-12-13 PROCEDURE — 80053 COMPREHEN METABOLIC PANEL: CPT | Performed by: PHYSICIAN ASSISTANT

## 2021-12-13 PROCEDURE — 3044F HG A1C LEVEL LT 7.0%: CPT | Performed by: PHYSICIAN ASSISTANT

## 2021-12-13 PROCEDURE — 90686 IIV4 VACC NO PRSV 0.5 ML IM: CPT | Performed by: PHYSICIAN ASSISTANT

## 2021-12-13 PROCEDURE — 80061 LIPID PANEL: CPT | Performed by: PHYSICIAN ASSISTANT

## 2021-12-13 PROCEDURE — 84443 ASSAY THYROID STIM HORMONE: CPT | Performed by: PHYSICIAN ASSISTANT

## 2021-12-13 RX ORDER — PROPRANOLOL HYDROCHLORIDE 160 MG/1
160 CAPSULE, EXTENDED RELEASE ORAL
Qty: 90 EACH | Refills: 2 | Status: SHIPPED | OUTPATIENT
Start: 2021-12-13 | End: 2022-10-19 | Stop reason: SDUPTHER

## 2021-12-13 NOTE — PROGRESS NOTES
Chief Complaint  Obesity and Rash    Subjective          History of Present Illness  Jessica Ugalde presents to [facility] for   Right Knee and Hip Pain:  Saw ortho as dir and sx are improved with injections. Has f/u with them only if needed now.    Diarrhea:  Saw GI for the diarrhea and they started her on Xifaxan for 10 days and has finished this, it helped some with the diarrhea. Had colonoscopy and it was ok, has to f/u in 5 years for repeat.     [place] Lesion:  Seen on CT in  but had repeat and it had resolved, it was through [facility]. She has not brought that in but has it on the portal.     Mammogram:  Is due for mammogram she thinks. Her last one was at [facility] but ins does not cover them anymore.    Sleep Apnea concern:  She has daytime fatigue, possible snoring, and night sweats, occ edema. No witnessed apneas. Has appt for sleep medicine in a week.    Anx/Dep:  Takes propranolol 10mg prn for anxiety. Sees therapist through [facility] through  and sees a Dr there. Has PTSD and dep, anx, binge eating d/o. Sees therapist every week and Psych Dr monthly. Takes bupropion, has helped depression.  Is on remeron for depression and insomnia but they are considering stopping that due to weight gain. Takes effexor as well and has been well controlled on that medication for a long time. Psych manages her psych meds, Dr Chela Gallagher.    Hypothyroid:  Has been well controled on synthroid 150 for a long time.     HTN:  Controlled on propranolol LA 160mg, checks BP at home sometimes and is not elevated.  No CP/SOA. No hx of cardiac work up before, no echo/stress test. PGF  age 65 of AMI. No significant edema, occ ankle swelling if eats extra salty foods. Thinks some of her elevated BP here is due to anxiety.  Takes propranolol quick acting 10 mg as needed for anxiety    HLD:  Had elevated cholesterol at last visit, and is seeing nutritionist now and  has lost 5lbs.     Prediabetes:  Has been working on some diet changes, has not cut back on carbs but is doing well with portion control and not skipping meals.        Review of Systems   Constitutional: Negative for fever and unexpected weight loss.   Respiratory: Negative for cough, shortness of breath and wheezing.    Cardiovascular: Negative for chest pain and palpitations.   Skin: Positive for rash.       The following portions of the patient's history were reviewed and updated as appropriate: allergies, current medications, past family history, past medical history, past social history, past surgical history and problem list.  Allergies   Allergen Reactions   • Fish-Derived Products Itching   • Lortab [Hydrocodone-Acetaminophen] Itching   • Morphine GI Intolerance   • Shellfish-Derived Products Itching     Current Outpatient Medications on File Prior to Visit   Medication Sig Dispense Refill   • buPROPion XL (FORFIVO XL) 450 MG 24 hr tablet bupropion HCl  mg 24 hr tablet, extended release   Take 1 tablet every day by oral route.     • calcium citrate-vitamin d (CITRACAL) 200-250 MG-UNIT tablet tablet Take  by mouth Daily.     • fluticasone (FLONASE) 50 MCG/ACT nasal spray 2 sprays into the nostril(s) as directed by provider Daily.     • levothyroxine (SYNTHROID, LEVOTHROID) 150 MCG tablet Take 1 tablet by mouth Daily. 90 tablet 2   • mirtazapine (REMERON) 15 MG tablet mirtazapine 15 mg tablet   Take 1 tab nightly x1 week, then take 2 tabs (30 mg) nightly thereafter     • propranolol (INDERAL) 10 MG tablet Take 10 mg by mouth 2 (Two) Times a Day.     • venlafaxine XR (EFFEXOR-XR) 150 MG 24 hr capsule venlafaxine  mg capsule,extended release 24 hr   Take 1 capsule every day by oral route.     • Vitamin D, Cholecalciferol, 25 MCG (1000 UT) capsule Take  by mouth.       No current facility-administered medications on file prior to visit.       Social History     Tobacco Use   Smoking Status Never  "Smoker   Smokeless Tobacco Never Used        Objective   Vital Signs:   Vitals:    12/13/21 1302   BP: 134/72   Pulse: 58   Resp: 20   Temp: 97.1 °F (36.2 °C)   TempSrc: Temporal   SpO2: 98%   Weight: (!) 137 kg (302 lb 11.2 oz)   Height: 172.7 cm (67.99\")   PainSc: 0-No pain      Physical Exam  Vitals reviewed.   Constitutional:       General: She is not in acute distress.     Appearance: Normal appearance.   HENT:      Head: Normocephalic and atraumatic.   Eyes:      General: No scleral icterus.     Extraocular Movements: Extraocular movements intact.      Conjunctiva/sclera: Conjunctivae normal.   Cardiovascular:      Rate and Rhythm: Normal rate and regular rhythm.      Heart sounds: Normal heart sounds. No murmur heard.      Pulmonary:      Effort: Pulmonary effort is normal. No respiratory distress.      Breath sounds: Normal breath sounds. No stridor. No wheezing or rhonchi.   Musculoskeletal:      Cervical back: Normal range of motion and neck supple.   Skin:     General: Skin is warm and dry.      Coloration: Skin is not jaundiced.      Findings: Rash (eyrth papules under and sides of eyes bilat, also on nose where glasses sit) present.   Neurological:      General: No focal deficit present.      Mental Status: She is alert and oriented to person, place, and time.      Gait: Gait normal.   Psychiatric:         Mood and Affect: Mood normal.         Behavior: Behavior normal.          Result Review :                New Medications Ordered This Visit   Medications   • propranolol LA (INDERAL LA) 160 MG 24 hr capsule     Sig: Take 1 capsule by mouth Daily.     Dispense:  90 each     Refill:  2          Assessment and Plan    Diagnoses and all orders for this visit:    1. Essential hypertension (Primary)  Assessment & Plan:  Hypertension is improving with treatment.  Continue current treatment regimen.  Blood pressure will be reassessed at the next regular appointment.    Orders:  -     propranolol LA (INDERAL " LA) 160 MG 24 hr capsule; Take 1 capsule by mouth Daily.  Dispense: 90 each; Refill: 2    2. Rash  Assessment & Plan:  Dermatitis, suggested can use hydrocortisone daily for a few days to see if this improves the rash, if not please let me know, consider Derm referral      3. Acquired hypothyroidism  Assessment & Plan:  Continue Synthroid 150, will check labs today    Orders:  -     TSH Rfx On Abnormal To Free T4; Future  -     TSH Rfx On Abnormal To Free T4    4. Hyperlipidemia, unspecified hyperlipidemia type  Assessment & Plan:  Lipid abnormalities are unchanged.  Nutritional counseling was provided. and The patient was referred to the dietician.  Lipids will be reassessed in 3 months.  We will recheck lipids today, patient has lost 5 pounds    Orders:  -     Lipid Panel    5. Prediabetes  Assessment & Plan:  A1c today 5.9, continue to work on diet and exercise, discussed limiting carbs      Orders:  -     Cancel: Hemoglobin A1c; Future  -     POC Glycosylated Hemoglobin (Hb A1C)    6. Class 3 severe obesity due to excess calories without serious comorbidity with body mass index (BMI) of 45.0 to 49.9 in adult (HCC)  Assessment & Plan:  Patient's (Body mass index is 46.04 kg/m².) indicates that they are morbidly obese (BMI > 40 or > 35 with obesity - related health condition) with health conditions that include hypertension and dyslipidemias . Weight is improving with lifestyle modifications. BMI is is above average; BMI management plan is completed. We discussed portion control and increasing exercise.  Follow-up with nutritionist      7. Abnormal kidney function  Assessment & Plan:  Check labs    Orders:  -     Comprehensive Metabolic Panel; Future  -     Comprehensive Metabolic Panel    8. Need for vaccination  -     FluLaval/Fluarix/Fluzone >6 Months ([id number])    9. Encounter for screening mammogram for breast cancer  -     Mammo Screening Digital Tomosynthesis Bilateral With CAD; Future      Follow Up    Return in about 6 months (around 6/13/2022).    Follow up if symptoms worsen or persist or has new or concerning symptoms, go to ER for severe symptoms.   Reviewed common medication effects and side effects and advised to report side effects immediately.  Encouraged medication compliance and the importance of keeping scheduled follow up appointments with me and any other providers.  If a referral was made please contact our office if you have not heard about an appointment in the next 2 weeks.   If labs or images are ordered we will contact you with the results within the next week.  If you have not heard from us after a week please call our office to inquire about the results.   Patient was given instructions and counseling regarding her condition or for health maintenance advice. Please see specific information pulled into the AVS if appropriate.     Elsa Warner PA-C    * Please note that portions of this note were completed with a voice recognition program.

## 2021-12-14 ENCOUNTER — TELEPHONE (OUTPATIENT)
Dept: INTERNAL MEDICINE | Facility: CLINIC | Age: 56
End: 2021-12-14

## 2021-12-14 PROBLEM — R73.03 PREDIABETES: Status: ACTIVE | Noted: 2021-12-14

## 2021-12-14 PROBLEM — N28.9 ABNORMAL KIDNEY FUNCTION: Status: ACTIVE | Noted: 2021-12-14

## 2021-12-14 PROBLEM — R21 RASH: Status: ACTIVE | Noted: 2021-12-14

## 2021-12-14 NOTE — ASSESSMENT & PLAN NOTE
Patient's (Body mass index is 46.04 kg/m².) indicates that they are morbidly obese (BMI > 40 or > 35 with obesity - related health condition) with health conditions that include hypertension and dyslipidemias . Weight is improving with lifestyle modifications. BMI is is above average; BMI management plan is completed. We discussed portion control and increasing exercise.  Follow-up with nutritionist

## 2021-12-14 NOTE — ASSESSMENT & PLAN NOTE
Lipid abnormalities are unchanged.  Nutritional counseling was provided. and The patient was referred to the dietician.  Lipids will be reassessed in 3 months.  We will recheck lipids today, patient has lost 5 pounds

## 2021-12-14 NOTE — ASSESSMENT & PLAN NOTE
Dermatitis, suggested can use hydrocortisone daily for a few days to see if this improves the rash, if not please let me know, consider Derm referral

## 2021-12-15 ENCOUNTER — HOSPITAL ENCOUNTER (OUTPATIENT)
Dept: NUTRITION | Facility: HOSPITAL | Age: 56
Setting detail: RECURRING SERIES
Discharge: HOME | End: 2021-12-15

## 2021-12-15 DIAGNOSIS — E03.9 ACQUIRED HYPOTHYROIDISM: ICD-10-CM

## 2021-12-15 RX ORDER — LEVOTHYROXINE SODIUM 0.15 MG/1
150 TABLET ORAL DAILY
Qty: 90 TABLET | Refills: 2 | Status: SHIPPED | OUTPATIENT
Start: 2021-12-15 | End: 2022-10-19 | Stop reason: SDUPTHER

## 2021-12-15 NOTE — PROGRESS NOTES
Adult Outpatient Nutrition    Patient Name:  Jessica Ugalde  YOB: 1965  MRN: [id number]    Assessment Date:  12/15/2021    Comments:      This medical referred consult was provided via phone as patient was unable to attend an in-office appointment today due to the COVID-19 crisis. Consent for treatment was given verbally. RD spent a total of 60 minutes with patient today.      Patient is a 56 year old female seen today for a nutrition follow up visit related to weight loss. Patient reported she is starting to feel so much better since making changes to her diet. She shared her IBS-D symptoms have already started to decrease in severity and are no longer everyday. She shared she has been able to start eating a balanced breakfast about 3 days per week and she has also been eating a balanced lunch and dinner 7 days per week. Patient reported she has lost 5 lbs in the past month and is feeling encouraged by this. Patient does not feel stressed and she feels the behaviors she has changed are sustainable. Patient stated she is seeking support from RD to build upon the changes she has made. She stated she would like to talk about how to promote healthy lipid levels through diet.     Pertinent Labs:  Lab Results   Component Value Date    CHOL 225 (H) 12/13/2021    CHOL 222 (H) 06/22/2021     Lab Results   Component Value Date    TRIG 133 12/13/2021    TRIG 198 (H) 06/22/2021     Lab Results   Component Value Date    HDL 35 (L) 12/13/2021    HDL 36 (L) 06/22/2021     Lab Results   Component Value Date     (H) 12/13/2021     (H) 06/22/2021     Lab Results   Component Value Date    HGBA1C 5.9 (H) 12/13/2021     24-Hour Dietary Recall:  9:00 am - apple with peanut butter  12:00 pm - bean and cheese burrito  5:30 pm: large chopped salad with grilled chicken at home  Beverages: 64 fl oz water     RD encouraged patient with the progress she has made thus far. RD explained the roll fiber can  play in promoting healthy lipid levels in the body. RD recommended patient aim to slowly increase her fiber intake to have at least 25 grams of fiber per day. RD recommended patient aim to start with 10 grams fiber for one week, and increase by 5 grams each consecutive week. RD discussed high food sources of fiber with the patient. We discussed several meal and snack ideas. RD received permission to mail patient a high fiber list from the [employer] of [employer]. Patient stated she is feeling encouraged with these resources and next steps. She feels confident and would like to schedule a follow up visit for after the Holidays to see how she is doing with increasing her fiber intake. RD encouraged patient to contact RD with any needs prior to scheduled follow up visit.      Goals:  1) Have a balanced breakfast 3 days per week - 100%     Total of 60 minutes spent with patient on nutrition counseling. Education based on Academy of [employer] and Dietetics guidelines. Patient was provided with RD's contact information. Follow up visit is scheduled for 1/26/21 at 10:00 am. Thank you for this referral.           Electronically signed by:  Fozia Nickerson RD  12/15/21 14:09 EST

## 2021-12-17 ENCOUNTER — TELEPHONE (OUTPATIENT)
Dept: INTERNAL MEDICINE | Facility: CLINIC | Age: 56
End: 2021-12-17

## 2021-12-17 NOTE — TELEPHONE ENCOUNTER
----- Message from Elsa Warner PA-C sent at 12/17/2021  3:31 PM EST -----  Labs showed stable kidney function and normal thyroid levels. Your cholesterol is still elevated. Please continue to work on a heart healthy diet low in saturated fats and processed carbs and high in fiber, exercise regularly. We will recheck at your follow up in the summer.

## 2021-12-20 NOTE — TELEPHONE ENCOUNTER
LVM for the patient to return our call, office number was provided      HUB TO READ: Please provide the patient of the below provider comments.     ----- Message from Elsa Warner PA-C sent at 12/17/2021  3:31 PM EST -----  Labs showed stable kidney function and normal thyroid levels. Your cholesterol is still elevated. Please continue to work on a heart healthy diet low in saturated fats and processed carbs and high in fiber, exercise regularly. We will recheck at your follow up in the summer.

## 2021-12-21 NOTE — TELEPHONE ENCOUNTER
LVM for the patient to return our call, office number was provided       HUB TO READ: Please provide the patient of the below provider comments.      ----- Message from Elsa Warner PA-C sent at 12/17/2021  3:31 PM EST -----  Labs showed stable kidney function and normal thyroid levels. Your cholesterol is still elevated. Please continue to work on a heart healthy diet low in saturated fats and processed carbs and high in fiber, exercise regularly. We will recheck at your follow up in the summer.     Unable to reach the patient, okay to send lab letter with your comments?

## 2022-10-18 ENCOUNTER — APPOINTMENT (OUTPATIENT)
Dept: MAMMOGRAPHY | Facility: HOSPITAL | Age: 57
End: 2022-10-18

## 2022-10-19 ENCOUNTER — OFFICE VISIT (OUTPATIENT)
Dept: INTERNAL MEDICINE | Facility: CLINIC | Age: 57
End: 2022-10-19

## 2022-10-19 VITALS
HEART RATE: 64 BPM | TEMPERATURE: 96.9 F | DIASTOLIC BLOOD PRESSURE: 72 MMHG | BODY MASS INDEX: 44.1 KG/M2 | SYSTOLIC BLOOD PRESSURE: 118 MMHG | HEIGHT: 68 IN | OXYGEN SATURATION: 97 % | WEIGHT: 291 LBS | RESPIRATION RATE: 20 BRPM

## 2022-10-19 DIAGNOSIS — G47.00 INSOMNIA, UNSPECIFIED TYPE: ICD-10-CM

## 2022-10-19 DIAGNOSIS — R73.03 PREDIABETES: ICD-10-CM

## 2022-10-19 DIAGNOSIS — F41.9 ANXIETY: ICD-10-CM

## 2022-10-19 DIAGNOSIS — G60.9 IDIOPATHIC PERIPHERAL NEUROPATHY: Primary | ICD-10-CM

## 2022-10-19 DIAGNOSIS — F33.41 RECURRENT MAJOR DEPRESSIVE DISORDER, IN PARTIAL REMISSION: ICD-10-CM

## 2022-10-19 DIAGNOSIS — E78.2 MIXED HYPERLIPIDEMIA: ICD-10-CM

## 2022-10-19 DIAGNOSIS — G56.03 BILATERAL CARPAL TUNNEL SYNDROME: ICD-10-CM

## 2022-10-19 DIAGNOSIS — E55.9 VITAMIN D DEFICIENCY: ICD-10-CM

## 2022-10-19 DIAGNOSIS — I10 ESSENTIAL HYPERTENSION: ICD-10-CM

## 2022-10-19 DIAGNOSIS — E03.9 ACQUIRED HYPOTHYROIDISM: ICD-10-CM

## 2022-10-19 LAB
EXPIRATION DATE: NORMAL
HBA1C MFR BLD: 5.8 %
Lab: NORMAL

## 2022-10-19 PROCEDURE — 99214 OFFICE O/P EST MOD 30 MIN: CPT | Performed by: PHYSICIAN ASSISTANT

## 2022-10-19 PROCEDURE — 83036 HEMOGLOBIN GLYCOSYLATED A1C: CPT | Performed by: PHYSICIAN ASSISTANT

## 2022-10-19 PROCEDURE — 3044F HG A1C LEVEL LT 7.0%: CPT | Performed by: PHYSICIAN ASSISTANT

## 2022-10-19 RX ORDER — PROPRANOLOL HYDROCHLORIDE 160 MG/1
160 CAPSULE, EXTENDED RELEASE ORAL
Qty: 90 EACH | Refills: 1 | Status: SHIPPED | OUTPATIENT
Start: 2022-10-19

## 2022-10-19 RX ORDER — LEVOTHYROXINE SODIUM 0.15 MG/1
150 TABLET ORAL DAILY
Qty: 90 TABLET | Refills: 1 | Status: SHIPPED | OUTPATIENT
Start: 2022-10-19

## 2022-10-19 RX ORDER — VENLAFAXINE HYDROCHLORIDE 150 MG/1
150 CAPSULE, EXTENDED RELEASE ORAL DAILY
Qty: 90 CAPSULE | Refills: 1 | Status: SHIPPED | OUTPATIENT
Start: 2022-10-19

## 2022-10-19 RX ORDER — MIRTAZAPINE 15 MG/1
15 TABLET, FILM COATED ORAL NIGHTLY
Qty: 90 TABLET | Refills: 1 | Status: SHIPPED | OUTPATIENT
Start: 2022-10-19

## 2022-10-19 RX ORDER — BUPROPION HYDROCHLORIDE 300 MG/1
300 TABLET ORAL DAILY
Qty: 90 TABLET | Refills: 1 | Status: SHIPPED | OUTPATIENT
Start: 2022-10-19

## 2022-10-19 NOTE — ASSESSMENT & PLAN NOTE
Refer to neuro for work up. Check EMG. Consider podiatry referral, wear wrist braces and take nsaids to see if this improves hand/finger neuropathy, concern for carpal tunnel.

## 2022-10-19 NOTE — ASSESSMENT & PLAN NOTE
Results for orders placed or performed in visit on 10/19/22   POC Glycosylated Hemoglobin (Hb A1C)    Specimen: Blood   Result Value Ref Range    Hemoglobin A1C 5.8 %    Lot Number [id number]     Expiration Date 6/14/2024      Continue to work on diet and exercise

## 2022-10-19 NOTE — PROGRESS NOTES
Answers for HPI/ROS submitted by the patient on 10/12/2022  Please describe your symptoms.: Increase in neuropathy in feet and fingers. Sharp stabbing pain on inside of left heel.  Have you had these symptoms before?: Yes  How long have you been having these symptoms?: Greater than 2 weeks  Please list any medications you are currently taking for this condition.: Aleve as needed.  Please describe any probable cause for these symptoms. : Do not know.  What is the primary reason for your visit?: Other    Chief Complaint  Peripheral Neuropathy    Subjective          History of Present Illness  Jessica Ugalde presents to [facility] for   History of Present Illness  Peripheral polyneuropathy:  Sx started 8 years ago with hand/feet tingling/pain.   Previously her hand neuropathy was resolved with cubital tunnel syndrome surgery but over the last 2 months it has returned and she has weakness and numbness in her hands in the first 3 fingers.  She has not tried wrist braces yet.  NSAIDs help some of the pain.  Does not work pain in her wrists.  She was previously worked up by [facility] for peripheral neuropathy in her feet and no cause was found. Has numbness on all of her toes and the bottom of her foot and also the top of her foot and up the shin some, worse on her right than the left. She does not feel pain or temperature on her feet. It does affect her balance, is nervous to go up and down stairs. No problem driving. Does not feel weakness in feet. Had EMG on her legs/feet in the past and nothing was abnormal. Has had sciatica pain in the past and sometimes will have tightness in her buttocks and low back pain but not at the same time. Has had imaging on her back in the past showing mild degenerative disease.     Anx/Dep/insomnia:  Currently on effexor 150, wellbutrin 300, and Remeron 15.  Symptoms are currently well controlled.  She was seeing a therapist through [facility]  through [employer] but has done well and completed the program and they do not have her down for follow-up currently. No HI/SI.    Hypothyroid:  Has been well controled on synthroid 150 for a long time.   Lab Results       Component                Value               Date                       TSH                      1.530               2021              HLD:  Lab Results       Component                Value               Date                       CHOL                     225 (H)             2021                 TRIG                     133                 2021                 HDL                      35 (L)              2021                 LDL                      166 (H)             2021            Working on diet and exercise to manage.  Has lost about 10 pounds in the last year.    HTN:  Controlled on propranolol LA 160mg, checks BP at home.  No CP/SOA. No hx of cardiac work up before, no echo/stress test. PGF  age 65 of AMI.       Review of Systems   Constitutional: Negative for fever and unexpected weight loss.   Respiratory: Negative for cough, shortness of breath and wheezing.    Cardiovascular: Negative for chest pain and palpitations.   Musculoskeletal: Positive for back pain. Negative for joint swelling.       The following portions of the patient's history were reviewed and updated as appropriate: allergies, current medications, past family history, past medical history, past social history, past surgical history and problem list.  Allergies   Allergen Reactions   • Fish-Derived Products Itching   • Lortab [Hydrocodone-Acetaminophen] Itching   • Morphine GI Intolerance   • Shellfish-Derived Products Itching       Current Outpatient Medications:   •  fluticasone (FLONASE) 50 MCG/ACT nasal spray, 2 sprays into the nostril(s) as directed by provider Daily., Disp: , Rfl:   •  levothyroxine (SYNTHROID, LEVOTHROID) 150 MCG tablet, Take 1 tablet by mouth Daily., Disp: 90 tablet,  "Rfl: 1  •  mirtazapine (REMERON) 15 MG tablet, Take 1 tablet by mouth Every Night., Disp: 90 tablet, Rfl: 1  •  propranolol (INDERAL) 10 MG tablet, Take 10 mg by mouth 2 (Two) Times a Day., Disp: , Rfl:   •  propranolol LA (INDERAL LA) 160 MG 24 hr capsule, Take 1 capsule by mouth Daily., Disp: 90 each, Rfl: 1  •  venlafaxine XR (EFFEXOR-XR) 150 MG 24 hr capsule, Take 1 capsule by mouth Daily., Disp: 90 capsule, Rfl: 1  •  Vitamin D, Cholecalciferol, 25 MCG (1000 UT) capsule, Take  by mouth., Disp: , Rfl:   •  buPROPion XL (Wellbutrin XL) 300 MG 24 hr tablet, Take 1 tablet by mouth Daily., Disp: 90 tablet, Rfl: 1  New Medications Ordered This Visit   Medications   • buPROPion XL (Wellbutrin XL) 300 MG 24 hr tablet     Sig: Take 1 tablet by mouth Daily.     Dispense:  90 tablet     Refill:  1   • venlafaxine XR (EFFEXOR-XR) 150 MG 24 hr capsule     Sig: Take 1 capsule by mouth Daily.     Dispense:  90 capsule     Refill:  1   • mirtazapine (REMERON) 15 MG tablet     Sig: Take 1 tablet by mouth Every Night.     Dispense:  90 tablet     Refill:  1   • propranolol LA (INDERAL LA) 160 MG 24 hr capsule     Sig: Take 1 capsule by mouth Daily.     Dispense:  90 each     Refill:  1   • levothyroxine (SYNTHROID, LEVOTHROID) 150 MCG tablet     Sig: Take 1 tablet by mouth Daily.     Dispense:  90 tablet     Refill:  1     Social History     Tobacco Use   Smoking Status Never   Smokeless Tobacco Never        Objective   Vital Signs:   Vitals:    10/19/22 1053   BP: 118/72   Pulse: 64   Resp: 20   Temp: 96.9 °F (36.1 °C)   TempSrc: Temporal   SpO2: 97%   Weight: 132 kg (291 lb)   Height: 172.7 cm (67.99\")   PainSc:   6   PainLoc: Foot  Comment: feet and hands      Physical Exam  Vitals reviewed.   Constitutional:       General: She is not in acute distress.     Appearance: Normal appearance.   HENT:      Head: Normocephalic and atraumatic.   Eyes:      General: No scleral icterus.     Extraocular Movements: Extraocular movements " intact.      Conjunctiva/sclera: Conjunctivae normal.   Cardiovascular:      Rate and Rhythm: Normal rate and regular rhythm.      Heart sounds: Normal heart sounds. No murmur heard.  Pulmonary:      Effort: Pulmonary effort is normal. No respiratory distress.      Breath sounds: Normal breath sounds. No stridor. No wheezing or rhonchi.   Musculoskeletal:         General: No tenderness or signs of injury.      Cervical back: Normal range of motion and neck supple.      Right lower leg: No edema.      Left lower leg: No edema.      Comments: Pos tinel test   Skin:     General: Skin is warm and dry.      Coloration: Skin is not jaundiced.   Neurological:      General: No focal deficit present.      Mental Status: She is alert and oriented to person, place, and time.      Gait: Gait normal.   Psychiatric:         Mood and Affect: Mood normal.         Behavior: Behavior normal.        No LMP recorded. Patient has had a hysterectomy.    Result Review :                   Assessment and Plan    Diagnoses and all orders for this visit:    1. Idiopathic peripheral neuropathy (Primary)  Assessment & Plan:  Refer to neuro for work up. Check EMG. Consider podiatry referral, wear wrist braces and take nsaids to see if this improves hand/finger neuropathy, concern for carpal tunnel.     Orders:  -     EMG & Nerve Conduction Test; Future  -     Ambulatory Referral to Neurology  -     Vitamin B12; Future    2. Bilateral carpal tunnel syndrome  Assessment & Plan:  Possible cause of hand neuropathy, wear wrist braces, nsaids prn, EMG      3. Prediabetes  Assessment & Plan:  Results for orders placed or performed in visit on 10/19/22   POC Glycosylated Hemoglobin (Hb A1C)    Specimen: Blood   Result Value Ref Range    Hemoglobin A1C 5.8 %    Lot Number [id number]     Expiration Date 6/14/2024      Continue to work on diet and exercise    Orders:  -     POC Glycosylated Hemoglobin (Hb A1C)    4. Recurrent major depressive disorder, in  partial remission (HCC)  Assessment & Plan:  Chronic, stable, continue Effexor, Wellbutrin    Orders:  -     buPROPion XL (Wellbutrin XL) 300 MG 24 hr tablet; Take 1 tablet by mouth Daily.  Dispense: 90 tablet; Refill: 1  -     venlafaxine XR (EFFEXOR-XR) 150 MG 24 hr capsule; Take 1 capsule by mouth Daily.  Dispense: 90 capsule; Refill: 1    5. Anxiety  Assessment & Plan:  Chronic, stable, continue Effexor, Wellbutrin    Orders:  -     buPROPion XL (Wellbutrin XL) 300 MG 24 hr tablet; Take 1 tablet by mouth Daily.  Dispense: 90 tablet; Refill: 1  -     venlafaxine XR (EFFEXOR-XR) 150 MG 24 hr capsule; Take 1 capsule by mouth Daily.  Dispense: 90 capsule; Refill: 1    6. Essential hypertension  -     propranolol LA (INDERAL LA) 160 MG 24 hr capsule; Take 1 capsule by mouth Daily.  Dispense: 90 each; Refill: 1  -     Comprehensive Metabolic Panel; Future  -     CBC Auto Differential; Future    7. Acquired hypothyroidism  Assessment & Plan:  Continue Synthroid 150, will check labs today    Orders:  -     levothyroxine (SYNTHROID, LEVOTHROID) 150 MCG tablet; Take 1 tablet by mouth Daily.  Dispense: 90 tablet; Refill: 1  -     TSH Rfx On Abnormal To Free T4; Future    8. Vitamin D deficiency  -     Vitamin D,25-Hydroxy; Future    9. Insomnia, unspecified type  Assessment & Plan:  Chronic, stable, cont remeron    Orders:  -     mirtazapine (REMERON) 15 MG tablet; Take 1 tablet by mouth Every Night.  Dispense: 90 tablet; Refill: 1    10. Mixed hyperlipidemia  Assessment & Plan:  Chronic, stable, continue working on diet and exercise, recheck FLP    Orders:  -     Lipid Panel; Future      Follow Up   Return in about 3 months (around 1/19/2023).    Follow up if symptoms worsen or persist or has new or concerning symptoms, go to ER for severe symptoms.   Reviewed common medication effects and side effects and advised to report side effects immediately.  Encouraged medication compliance and the importance of keeping scheduled  follow up appointments with me and any other providers.  If a referral was made please contact our office if you have not heard about an appointment in the next 2 weeks.   If labs or images are ordered we will contact you with the results within the next week.  If you have not heard from us after a week please call our office to inquire about the results.   Patient was given instructions and counseling regarding her condition or for health maintenance advice. Please see specific information pulled into the AVS if appropriate.     Elsa Warner PA-C    * Please note that portions of this note were completed with a voice recognition program.

## 2022-10-20 ENCOUNTER — LAB (OUTPATIENT)
Dept: LAB | Facility: HOSPITAL | Age: 57
End: 2022-10-20

## 2022-10-20 DIAGNOSIS — E03.9 ACQUIRED HYPOTHYROIDISM: ICD-10-CM

## 2022-10-20 DIAGNOSIS — E78.2 MIXED HYPERLIPIDEMIA: ICD-10-CM

## 2022-10-20 DIAGNOSIS — G60.9 IDIOPATHIC PERIPHERAL NEUROPATHY: ICD-10-CM

## 2022-10-20 DIAGNOSIS — I10 ESSENTIAL HYPERTENSION: ICD-10-CM

## 2022-10-20 DIAGNOSIS — E55.9 VITAMIN D DEFICIENCY: ICD-10-CM

## 2022-10-20 LAB
25(OH)D3 SERPL-MCNC: 35.5 NG/ML (ref 30–100)
ALBUMIN SERPL-MCNC: 4 G/DL (ref 3.5–5.2)
ALBUMIN/GLOB SERPL: 1.5 G/DL
ALP SERPL-CCNC: 134 U/L (ref 39–117)
ALT SERPL W P-5'-P-CCNC: 15 U/L (ref 1–33)
ANION GAP SERPL CALCULATED.3IONS-SCNC: 14 MMOL/L (ref 5–15)
AST SERPL-CCNC: 16 U/L (ref 1–32)
BASOPHILS # BLD AUTO: 0.07 10*3/MM3 (ref 0–0.2)
BASOPHILS NFR BLD AUTO: 0.7 % (ref 0–1.5)
BILIRUB SERPL-MCNC: <0.2 MG/DL (ref 0–1.2)
BUN SERPL-MCNC: 14 MG/DL (ref 6–20)
BUN/CREAT SERPL: 13.9 (ref 7–25)
CALCIUM SPEC-SCNC: 9.2 MG/DL (ref 8.6–10.5)
CHLORIDE SERPL-SCNC: 100 MMOL/L (ref 98–107)
CHOLEST SERPL-MCNC: 192 MG/DL (ref 0–200)
CO2 SERPL-SCNC: 25 MMOL/L (ref 22–29)
CREAT SERPL-MCNC: 1.01 MG/DL (ref 0.57–1)
DEPRECATED RDW RBC AUTO: 44.2 FL (ref 37–54)
EGFRCR SERPLBLD CKD-EPI 2021: 65.5 ML/MIN/1.73
EOSINOPHIL # BLD AUTO: 0.17 10*3/MM3 (ref 0–0.4)
EOSINOPHIL NFR BLD AUTO: 1.6 % (ref 0.3–6.2)
ERYTHROCYTE [DISTWIDTH] IN BLOOD BY AUTOMATED COUNT: 13.9 % (ref 12.3–15.4)
GLOBULIN UR ELPH-MCNC: 2.7 GM/DL
GLUCOSE SERPL-MCNC: 92 MG/DL (ref 65–99)
HCT VFR BLD AUTO: 41.7 % (ref 34–46.6)
HDLC SERPL-MCNC: 41 MG/DL (ref 40–60)
HGB BLD-MCNC: 13.6 G/DL (ref 12–15.9)
IMM GRANULOCYTES # BLD AUTO: 0.02 10*3/MM3 (ref 0–0.05)
IMM GRANULOCYTES NFR BLD AUTO: 0.2 % (ref 0–0.5)
LDLC SERPL CALC-MCNC: 110 MG/DL (ref 0–100)
LDLC/HDLC SERPL: 2.54 {RATIO}
LYMPHOCYTES # BLD AUTO: 1.88 10*3/MM3 (ref 0.7–3.1)
LYMPHOCYTES NFR BLD AUTO: 17.6 % (ref 19.6–45.3)
MCH RBC QN AUTO: 28.5 PG (ref 26.6–33)
MCHC RBC AUTO-ENTMCNC: 32.6 G/DL (ref 31.5–35.7)
MCV RBC AUTO: 87.4 FL (ref 79–97)
MONOCYTES # BLD AUTO: 0.44 10*3/MM3 (ref 0.1–0.9)
MONOCYTES NFR BLD AUTO: 4.1 % (ref 5–12)
NEUTROPHILS NFR BLD AUTO: 75.8 % (ref 42.7–76)
NEUTROPHILS NFR BLD AUTO: 8.09 10*3/MM3 (ref 1.7–7)
NRBC BLD AUTO-RTO: 0 /100 WBC (ref 0–0.2)
PLATELET # BLD AUTO: 362 10*3/MM3 (ref 140–450)
PMV BLD AUTO: 10.6 FL (ref 6–12)
POTASSIUM SERPL-SCNC: 4.4 MMOL/L (ref 3.5–5.2)
PROT SERPL-MCNC: 6.7 G/DL (ref 6–8.5)
RBC # BLD AUTO: 4.77 10*6/MM3 (ref 3.77–5.28)
SODIUM SERPL-SCNC: 139 MMOL/L (ref 136–145)
TRIGL SERPL-MCNC: 234 MG/DL (ref 0–150)
TSH SERPL DL<=0.05 MIU/L-ACNC: 0.46 UIU/ML (ref 0.27–4.2)
VIT B12 BLD-MCNC: 293 PG/ML (ref 211–946)
VLDLC SERPL-MCNC: 41 MG/DL (ref 5–40)
WBC NRBC COR # BLD: 10.67 10*3/MM3 (ref 3.4–10.8)

## 2022-10-20 PROCEDURE — 82607 VITAMIN B-12: CPT

## 2022-10-20 PROCEDURE — 80061 LIPID PANEL: CPT

## 2022-10-20 PROCEDURE — 84443 ASSAY THYROID STIM HORMONE: CPT

## 2022-10-20 PROCEDURE — 85025 COMPLETE CBC W/AUTO DIFF WBC: CPT

## 2022-10-20 PROCEDURE — 80053 COMPREHEN METABOLIC PANEL: CPT

## 2022-10-20 PROCEDURE — 82306 VITAMIN D 25 HYDROXY: CPT

## 2022-11-19 ENCOUNTER — APPOINTMENT (OUTPATIENT)
Dept: OTHER | Facility: HOSPITAL | Age: 57
End: 2022-11-19

## 2022-11-19 ENCOUNTER — HOSPITAL ENCOUNTER (OUTPATIENT)
Dept: MAMMOGRAPHY | Facility: HOSPITAL | Age: 57
Discharge: HOME | End: 2022-11-19
Admitting: PHYSICIAN ASSISTANT

## 2022-11-19 DIAGNOSIS — Z12.31 ENCOUNTER FOR SCREENING MAMMOGRAM FOR BREAST CANCER: ICD-10-CM

## 2022-11-19 DIAGNOSIS — Z92.89 HX OF MAMMOGRAM: ICD-10-CM

## 2022-11-19 PROCEDURE — 77063 BREAST TOMOSYNTHESIS BI: CPT

## 2022-11-19 PROCEDURE — 77067 SCR MAMMO BI INCL CAD: CPT | Performed by: RADIOLOGY

## 2022-11-19 PROCEDURE — 77063 BREAST TOMOSYNTHESIS BI: CPT | Performed by: RADIOLOGY

## 2022-11-19 PROCEDURE — 77067 SCR MAMMO BI INCL CAD: CPT

## 2022-12-01 ENCOUNTER — HOSPITAL ENCOUNTER (OUTPATIENT)
Dept: NEUROLOGY | Facility: HOSPITAL | Age: 57
Discharge: HOME | End: 2022-12-01
Admitting: PHYSICIAN ASSISTANT

## 2022-12-01 DIAGNOSIS — G60.9 IDIOPATHIC PERIPHERAL NEUROPATHY: ICD-10-CM

## 2022-12-01 PROCEDURE — 95886 MUSC TEST DONE W/N TEST COMP: CPT

## 2022-12-01 PROCEDURE — 95913 NRV CNDJ TEST 13/> STUDIES: CPT

## 2022-12-20 ENCOUNTER — OFFICE VISIT (OUTPATIENT)
Dept: INTERNAL MEDICINE | Facility: CLINIC | Age: 57
End: 2022-12-20

## 2022-12-20 ENCOUNTER — LAB (OUTPATIENT)
Dept: LAB | Facility: HOSPITAL | Age: 57
End: 2022-12-20

## 2022-12-20 VITALS
HEIGHT: 67 IN | TEMPERATURE: 96.9 F | BODY MASS INDEX: 45.45 KG/M2 | SYSTOLIC BLOOD PRESSURE: 124 MMHG | WEIGHT: 289.6 LBS | HEART RATE: 60 BPM | OXYGEN SATURATION: 99 % | RESPIRATION RATE: 18 BRPM | DIASTOLIC BLOOD PRESSURE: 74 MMHG

## 2022-12-20 DIAGNOSIS — G60.8 PERIPHERAL SENSORY-MOTOR AXONAL POLYNEUROPATHY: Primary | ICD-10-CM

## 2022-12-20 DIAGNOSIS — M25.561 ACUTE PAIN OF RIGHT KNEE: ICD-10-CM

## 2022-12-20 DIAGNOSIS — Z23 NEED FOR VACCINATION: ICD-10-CM

## 2022-12-20 DIAGNOSIS — E03.9 ACQUIRED HYPOTHYROIDISM: ICD-10-CM

## 2022-12-20 DIAGNOSIS — G60.8 PERIPHERAL SENSORY-MOTOR AXONAL POLYNEUROPATHY: ICD-10-CM

## 2022-12-20 LAB
ERYTHROCYTE [SEDIMENTATION RATE] IN BLOOD: 56 MM/HR (ref 0–30)
HAV IGM SERPL QL IA: NORMAL
HBV CORE IGM SERPL QL IA: NORMAL
HBV SURFACE AG SERPL QL IA: NORMAL
HCV AB SER DONR QL: NORMAL

## 2022-12-20 PROCEDURE — G0432 EIA HIV-1/HIV-2 SCREEN: HCPCS

## 2022-12-20 PROCEDURE — 86592 SYPHILIS TEST NON-TREP QUAL: CPT

## 2022-12-20 PROCEDURE — 85652 RBC SED RATE AUTOMATED: CPT

## 2022-12-20 PROCEDURE — 83655 ASSAY OF LEAD: CPT

## 2022-12-20 PROCEDURE — 80074 ACUTE HEPATITIS PANEL: CPT

## 2022-12-20 PROCEDURE — 90686 IIV4 VACC NO PRSV 0.5 ML IM: CPT | Performed by: PHYSICIAN ASSISTANT

## 2022-12-20 PROCEDURE — 90471 IMMUNIZATION ADMIN: CPT | Performed by: PHYSICIAN ASSISTANT

## 2022-12-20 PROCEDURE — 86038 ANTINUCLEAR ANTIBODIES: CPT

## 2022-12-20 PROCEDURE — 86618 LYME DISEASE ANTIBODY: CPT

## 2022-12-20 PROCEDURE — 84165 PROTEIN E-PHORESIS SERUM: CPT

## 2022-12-20 PROCEDURE — 84155 ASSAY OF PROTEIN SERUM: CPT

## 2022-12-20 PROCEDURE — 82175 ASSAY OF ARSENIC: CPT

## 2022-12-20 PROCEDURE — 86431 RHEUMATOID FACTOR QUANT: CPT

## 2022-12-20 PROCEDURE — 83825 ASSAY OF MERCURY: CPT

## 2022-12-20 PROCEDURE — 86200 CCP ANTIBODY: CPT

## 2022-12-20 PROCEDURE — 36415 COLL VENOUS BLD VENIPUNCTURE: CPT

## 2022-12-20 PROCEDURE — 86140 C-REACTIVE PROTEIN: CPT

## 2022-12-20 PROCEDURE — 83921 ORGANIC ACID SINGLE QUANT: CPT

## 2022-12-20 PROCEDURE — 99214 OFFICE O/P EST MOD 30 MIN: CPT | Performed by: PHYSICIAN ASSISTANT

## 2022-12-20 NOTE — PROGRESS NOTES
Answers for HPI/ROS submitted by the patient on 12/19/2022  Please describe your symptoms.: Followup for abnormal EMG/nerve conduction study.  Also, injured knee when I fell 10 days ago.  Have you had these symptoms before?: Yes  How long have you been having these symptoms?: 1-2 weeks  Please list any medications you are currently taking for this condition.: Ibuprofen as needed  Please describe any probable cause for these symptoms. : For the knee-fall directly on right knee  What is the primary reason for your visit?: Other    Chief Complaint  Results (Getting results from nerve study, and possibly get labs.  PT had to r/s appointment with Neuro until Feb because she had the flu) and Knee Pain (Right Knee pain after fall about 10 days ago, tripped over cat. Has had to rehome cat.)    Subjective          History of Present Illness  Jessica Ugalde presents to [facility] for   History of Present Illness  Peripheral polyneuropathy:  Sx started 8 years ago with hand/feet tingling/pain. Feet are getting worse and she now does not feel the front of her feet. Her right leg also has numbness extending up the shin. Over the last year has had worsening weakness of legs and difficulty with .   Previously her hand neuropathy was resolved with cubital tunnel syndrome surgery but over the last several months it has returned and she has weakness and numbness in her hands in the first 3 fingers.  She was previously worked up by [facility] for peripheral neuropathy in her feet when sx started 8 yr ago, EMG was nl at that time. Has numbness on all of her toes and the bottom of her foot and also the top of her foot and up the shin some, worse on her right than the left. She does not feel pain or temperature on her feet. It does affect her balance, is nervous to go up and down stairs.   Her dad developed neuropathy in his 80s, he was an alcoholic and smoked but no official cause was ever  found. No other known family members     Right knee pain:  She fell (tripped on her cat) and landed on her right knee. It was very painful at the time. It is sore below the kneecap, has a small area of swelling and is a little bruised. Has good stability with her knee.     Hypothyroid:  Has been well controled on synthroid 150 for a long time.   Lab Results       Component                Value               Date                       TSH                      0.462               10/20/2022                  HTN:  Controlled on propranolol LA 160mg, checks BP at home.  No CP/SOA. No hx of cardiac work up before, no echo/stress test. PGF  age 65 of AMI.     The following portions of the patient's history were reviewed and updated as appropriate: allergies, current medications, past family history, past medical history, past social history, past surgical history and problem list.  Allergies   Allergen Reactions   • Fish-Derived Products Itching   • Lortab [Hydrocodone-Acetaminophen] Itching   • Morphine GI Intolerance   • Shellfish-Derived Products Itching       Current Outpatient Medications:   •  buPROPion XL (Wellbutrin XL) 300 MG 24 hr tablet, Take 1 tablet by mouth Daily., Disp: 90 tablet, Rfl: 1  •  fluticasone (FLONASE) 50 MCG/ACT nasal spray, 2 sprays into the nostril(s) as directed by provider Daily., Disp: , Rfl:   •  levothyroxine (SYNTHROID, LEVOTHROID) 150 MCG tablet, Take 1 tablet by mouth Daily., Disp: 90 tablet, Rfl: 1  •  mirtazapine (REMERON) 15 MG tablet, Take 1 tablet by mouth Every Night., Disp: 90 tablet, Rfl: 1  •  propranolol (INDERAL) 10 MG tablet, Take 10 mg by mouth 2 (Two) Times a Day., Disp: , Rfl:   •  propranolol LA (INDERAL LA) 160 MG 24 hr capsule, Take 1 capsule by mouth Daily., Disp: 90 each, Rfl: 1  •  venlafaxine XR (EFFEXOR-XR) 150 MG 24 hr capsule, Take 1 capsule by mouth Daily., Disp: 90 capsule, Rfl: 1  •  Vitamin D, Cholecalciferol, 25 MCG (1000 UT) capsule, Take  by mouth.,  "Disp: , Rfl:   No orders of the defined types were placed in this encounter.    Social History     Tobacco Use   Smoking Status Never   Smokeless Tobacco Never        Objective   Vital Signs:   Vitals:    12/20/22 1303   BP: 124/74   BP Location: Right arm   Patient Position: Sitting   Pulse: 60   Resp: 18   Temp: 96.9 °F (36.1 °C)   TempSrc: Temporal   SpO2: 99%   Weight: 131 kg (289 lb 9.6 oz)   Height: 170.2 cm (67.01\")   PainSc:   3   PainLoc: Knee      Physical Exam  Vitals reviewed.   Constitutional:       General: She is not in acute distress.     Appearance: Normal appearance.   HENT:      Head: Normocephalic and atraumatic.   Eyes:      General: No scleral icterus.     Extraocular Movements: Extraocular movements intact.      Conjunctiva/sclera: Conjunctivae normal.   Cardiovascular:      Rate and Rhythm: Normal rate and regular rhythm.      Heart sounds: Normal heart sounds. No murmur heard.  Pulmonary:      Effort: Pulmonary effort is normal. No respiratory distress.      Breath sounds: Normal breath sounds. No stridor. No wheezing or rhonchi.   Musculoskeletal:      Cervical back: Normal range of motion and neck supple.   Skin:     General: Skin is warm and dry.      Coloration: Skin is not jaundiced.   Neurological:      General: No focal deficit present.      Mental Status: She is alert and oriented to person, place, and time.      Sensory: Sensory deficit present.      Gait: Gait normal.      Comments: Monofilament test 0/10 rt foot   Psychiatric:         Mood and Affect: Mood normal.         Behavior: Behavior normal.        No LMP recorded. Patient has had a hysterectomy.    Result Review :                   Assessment and Plan    Diagnoses and all orders for this visit:    1. Peripheral sensory-motor axonal polyneuropathy (Primary)  Assessment & Plan:  Has appt coming up with neuro in 2 mo, keep appointment, initiate lab work up.     Orders:  -     Methylmalonic Acid, Serum; Future  -     Cyclic " Citrul Peptide Antibody, IgG / IgA; Future  -     ARCELIA; Future  -     C-reactive Protein; Future  -     Sedimentation Rate; Future  -     Rheumatoid Factor; Future  -     Protein Elec + Interp, Serum; Future  -     Heavy Metals, Blood; Future  -     HIV-1 / O / 2 Ag / Antibody 4th Generation; Future  -     Hepatitis Panel, Acute; Future  -     RPR; Future  -     Lyme Disease Total Antibody With Reflex to Immunoassay; Future    2. Acute pain of right knee  Assessment & Plan:  Sx are improving at this time, rest, ice, f/u if sx persist over the next few weeks w/o improvement.      3. Acquired hypothyroidism    4. Need for vaccination  -     FluLaval/Fluzone >6 mos ([id number])      Follow Up   Return for Next scheduled follow up.    Follow up if symptoms worsen or persist or has new or concerning symptoms, go to ER for severe symptoms.   Reviewed common medication effects and side effects and advised to report side effects immediately.  Encouraged medication compliance and the importance of keeping scheduled follow up appointments with me and any other providers.  If a referral was made please contact our office if you have not heard about an appointment in the next 2 weeks.   If labs or images are ordered we will contact you with the results within the next week.  If you have not heard from us after a week please call our office to inquire about the results.   Patient was given instructions and counseling regarding her condition or for health maintenance advice. Please see specific information pulled into the AVS if appropriate.     Elsa Warner PA-C    * Please note that portions of this note were completed with a voice recognition program.

## 2022-12-21 LAB
CHROMATIN AB SERPL-ACNC: <10 IU/ML (ref 0–14)
CRP SERPL-MCNC: 3.09 MG/DL (ref 0–0.5)
HIV1+2 AB SER QL: NORMAL
RPR SER QL: NORMAL

## 2022-12-22 LAB
ALBUMIN SERPL ELPH-MCNC: 3.5 G/DL (ref 2.9–4.4)
ALBUMIN/GLOB SERPL: 1.1 {RATIO} (ref 0.7–1.7)
ALPHA1 GLOB SERPL ELPH-MCNC: 0.2 G/DL (ref 0–0.4)
ALPHA2 GLOB SERPL ELPH-MCNC: 0.9 G/DL (ref 0.4–1)
ANA SER QL: NEGATIVE
B BURGDOR IGG+IGM SER QL IA: NEGATIVE
B-GLOBULIN SERPL ELPH-MCNC: 1.4 G/DL (ref 0.7–1.3)
CCP IGA+IGG SERPL IA-ACNC: 2 UNITS (ref 0–19)
GAMMA GLOB SERPL ELPH-MCNC: 0.7 G/DL (ref 0.4–1.8)
GLOBULIN SER CALC-MCNC: 3.2 G/DL (ref 2.2–3.9)
LABORATORY COMMENT REPORT: ABNORMAL
M PROTEIN SERPL ELPH-MCNC: ABNORMAL G/DL
PROT PATTERN SERPL ELPH-IMP: ABNORMAL
PROT SERPL-MCNC: 6.7 G/DL (ref 6–8.5)

## 2022-12-25 LAB — METHYLMALONATE SERPL-SCNC: 251 NMOL/L (ref 0–378)

## 2022-12-28 LAB
ARSENIC BLD-MCNC: 2 UG/L (ref 0–9)
LEAD BLDV-MCNC: <1 UG/DL (ref 0–3.4)
MERCURY BLD-MCNC: <1 UG/L (ref 0–14.9)

## 2023-05-03 ENCOUNTER — TELEPHONE (OUTPATIENT)
Dept: NEUROLOGY | Facility: CLINIC | Age: 58
End: 2023-05-03
Payer: MEDICAID

## 2023-05-03 NOTE — TELEPHONE ENCOUNTER
"  Caller: Jessica Ugalde \"BINU\"    Relationship: Self    Best call back number: [phone]    What is the best time to reach you: ANY    Who are you requesting to speak with (clinical staff, provider,  specific staff member): ANY    Do you know the name of the person who called: CHEO    What was the call regarding: RESCHEDULING APPT FROM 5/18 TO 5/17    Do you require a callback: YES PLEASE    "

## 2023-05-04 NOTE — TELEPHONE ENCOUNTER
Called Katty back and per providers request asked if she could switch her appt to the day before (5/17) current appt and had offered 2:30pm opening however her grandson has cancer and gets Tx so really needs it to be closer to original appt time around 11am so she can pick him up from school.     Got Katty switched to the 11:30am on the 17th and thanked her for switching.

## 2023-08-01 ENCOUNTER — OFFICE VISIT (OUTPATIENT)
Dept: INTERNAL MEDICINE | Facility: CLINIC | Age: 58
End: 2023-08-01
Payer: MEDICAID

## 2023-08-01 VITALS
TEMPERATURE: 97.8 F | HEART RATE: 54 BPM | BODY MASS INDEX: 50.17 KG/M2 | OXYGEN SATURATION: 97 % | DIASTOLIC BLOOD PRESSURE: 92 MMHG | SYSTOLIC BLOOD PRESSURE: 142 MMHG | WEIGHT: 293 LBS

## 2023-08-01 DIAGNOSIS — G60.9 IDIOPATHIC PERIPHERAL NEUROPATHY: ICD-10-CM

## 2023-08-01 DIAGNOSIS — I10 ESSENTIAL HYPERTENSION: ICD-10-CM

## 2023-08-01 DIAGNOSIS — F41.9 ANXIETY: ICD-10-CM

## 2023-08-01 DIAGNOSIS — E78.2 MIXED HYPERLIPIDEMIA: ICD-10-CM

## 2023-08-01 DIAGNOSIS — E66.01 CLASS 3 SEVERE OBESITY DUE TO EXCESS CALORIES WITHOUT SERIOUS COMORBIDITY WITH BODY MASS INDEX (BMI) OF 50.0 TO 59.9 IN ADULT: Primary | ICD-10-CM

## 2023-08-01 DIAGNOSIS — R73.03 PREDIABETES: ICD-10-CM

## 2023-08-01 DIAGNOSIS — E03.9 ACQUIRED HYPOTHYROIDISM: ICD-10-CM

## 2023-08-01 DIAGNOSIS — F50.81 BINGE EATING DISORDER: ICD-10-CM

## 2023-08-01 DIAGNOSIS — F33.41 RECURRENT MAJOR DEPRESSIVE DISORDER, IN PARTIAL REMISSION: ICD-10-CM

## 2023-08-01 DIAGNOSIS — G47.00 INSOMNIA, UNSPECIFIED TYPE: ICD-10-CM

## 2023-08-01 DIAGNOSIS — J30.9 ALLERGIC RHINITIS, UNSPECIFIED SEASONALITY, UNSPECIFIED TRIGGER: ICD-10-CM

## 2023-08-01 PROCEDURE — 1160F RVW MEDS BY RX/DR IN RCRD: CPT | Performed by: PHYSICIAN ASSISTANT

## 2023-08-01 PROCEDURE — 3080F DIAST BP >= 90 MM HG: CPT | Performed by: PHYSICIAN ASSISTANT

## 2023-08-01 PROCEDURE — 99214 OFFICE O/P EST MOD 30 MIN: CPT | Performed by: PHYSICIAN ASSISTANT

## 2023-08-01 PROCEDURE — 3077F SYST BP >= 140 MM HG: CPT | Performed by: PHYSICIAN ASSISTANT

## 2023-08-01 PROCEDURE — 1159F MED LIST DOCD IN RCRD: CPT | Performed by: PHYSICIAN ASSISTANT

## 2023-08-01 RX ORDER — BUPROPION HYDROCHLORIDE 300 MG/1
300 TABLET ORAL DAILY
Qty: 30 TABLET | Refills: 0 | Status: CANCELLED | OUTPATIENT
Start: 2023-08-01

## 2023-08-01 RX ORDER — VENLAFAXINE HYDROCHLORIDE 150 MG/1
150 CAPSULE, EXTENDED RELEASE ORAL DAILY
Qty: 90 CAPSULE | Refills: 1 | Status: SHIPPED | OUTPATIENT
Start: 2023-08-01

## 2023-08-01 RX ORDER — BUPROPION HYDROCHLORIDE 450 MG/1
450 TABLET, FILM COATED, EXTENDED RELEASE ORAL DAILY
Qty: 90 TABLET | Refills: 1 | Status: SHIPPED | OUTPATIENT
Start: 2023-08-01

## 2023-08-01 RX ORDER — MIRTAZAPINE 15 MG/1
15 TABLET, FILM COATED ORAL NIGHTLY
Qty: 90 TABLET | Refills: 1 | Status: SHIPPED | OUTPATIENT
Start: 2023-08-01

## 2023-08-01 RX ORDER — PROPRANOLOL HYDROCHLORIDE 160 MG/1
160 CAPSULE, EXTENDED RELEASE ORAL DAILY
Qty: 90 EACH | Refills: 1 | Status: SHIPPED | OUTPATIENT
Start: 2023-08-01

## 2023-08-01 RX ORDER — LEVOTHYROXINE SODIUM 0.15 MG/1
150 TABLET ORAL DAILY
Qty: 90 TABLET | Refills: 1 | Status: SHIPPED | OUTPATIENT
Start: 2023-08-01

## 2023-08-07 RX ORDER — FLUCONAZOLE 150 MG/1
150 TABLET ORAL ONCE
Qty: 1 TABLET | Refills: 1 | Status: SHIPPED | OUTPATIENT
Start: 2023-08-07 | End: 2023-08-07

## 2023-08-07 RX ORDER — AZITHROMYCIN 250 MG/1
TABLET, FILM COATED ORAL
Qty: 6 TABLET | Refills: 0 | Status: SHIPPED | OUTPATIENT
Start: 2023-08-07

## 2023-08-14 DIAGNOSIS — I10 ESSENTIAL HYPERTENSION: ICD-10-CM

## 2023-08-14 DIAGNOSIS — E03.9 ACQUIRED HYPOTHYROIDISM: ICD-10-CM

## 2023-08-14 RX ORDER — BUPROPION HYDROCHLORIDE 300 MG/1
TABLET ORAL
Qty: 30 TABLET | OUTPATIENT
Start: 2023-08-14

## 2023-08-14 RX ORDER — LEVOTHYROXINE SODIUM 0.15 MG/1
150 TABLET ORAL DAILY
Qty: 30 TABLET | OUTPATIENT
Start: 2023-08-14

## 2023-08-14 RX ORDER — PROPRANOLOL HYDROCHLORIDE 160 MG/1
160 CAPSULE, EXTENDED RELEASE ORAL DAILY
Qty: 30 CAPSULE | OUTPATIENT
Start: 2023-08-14

## 2023-10-11 NOTE — PROGRESS NOTES
Neuro Office Visit      Encounter Date: 10/12/2023   Patient Name: Jessica Ugalde  : 1965   MRN: [id number]   PCP:  Elsa Warner PA-C     Chief Complaint:    Chief Complaint   Patient presents with    Idiopathic peripheral neuropathy       History of Present Illness: Jessica Ugalde is a 57 y.o. female who is here today in Neurology for neuropathy.    Onset of neuropathy approximately 8 years ago beginning with hand and feet tingling with the pain.    Neuropathy in both hands that resolved with bilateral cubital tunnel syndrome surgery.    Previously followed by [facility] with reported normal EMG.    Neuropathy has become much more painful and is extending up her legs.    Initially she had tingling, burning, cold sensations in her toes bilaterally.  This progressed to the ball of her foot and now it is on the superior and plantar aspects as well.  She is unable to feel her right big toe at all.    Neuropathy has caused her to be very cautious when walking and driving.    She can feel pressure on the bone but no other sensations.    Neuropathy has now started going up the anterior portion of her legs bilaterally and she is also noted it on the calf on the right.    Has also noted that her second and third fingers of both hands have a little less sensation.    Issues with gripping items and often drops items.    Difficulty opening jars or medicine bottles.    Previously on gabapentin but developed vision issues.  She did not feel that gabapentin was overly beneficial for her.    Struggles using stairs and has to hold the handrail for fear of walking.    Also has issues with feeling unbalanced because of the neuropathy.    EMG results reviewed with Dr. Cruz.  She has a mild neuropathy.    EMG & Nerve Conduction Test (2022 11:20)     Subjective      Past Medical History:   Past Medical History:   Diagnosis Date    Anxiety     Depression     Difficulty walking Last 14  months    Balance issues; steps challenging    Environmental allergies     Gall stones     HL (hearing loss)     Hyperlipidemia     Hypertension     Hypothyroidism     Neuropathy     Peripheral neuropathy 2014    Thyroid disease        Past Surgical History:   Past Surgical History:   Procedure Laterality Date    BREAST BIOPSY Left     US guided    CHOLECYSTECTOMY      HYSTERECTOMY      TONSILLECTOMY      TUBAL ABDOMINAL LIGATION      ULNAR NERVE TRANSPOSITION Bilateral     x2    UMBILICAL HERNIA REPAIR         Family History:   Family History   Problem Relation Age of Onset    Osteoporosis Mother     Arthritis Mother     Breast cancer Mother     Hypertension Mother     Hyperlipidemia Mother     Thyroid disease Mother     Dementia Mother         Dx with Ahlzheimers March 2022    Arthritis Father     Hypertension Father     Kidney disease Father     Mental illness Father     Neuropathy Father     Aneurysm Father         AAA    Alcohol abuse Father     Arthritis Sister     Hypertension Sister     Hyperlipidemia Sister     Kidney disease Sister     Mental illness Sister     Obesity Sister     Osteoporosis Sister     Thyroid disease Sister     Hypertension Brother     Obesity Brother     Tuberculosis Maternal Grandmother     Mental illness Paternal Grandmother     Stroke Paternal Grandmother     Heart attack Paternal Grandfather     Colon cancer Cousin     Brain cancer Grandson     Ovarian cancer Neg Hx        Social History:   Social History     Socioeconomic History    Marital status: Single   Tobacco Use    Smoking status: Never    Smokeless tobacco: Never   Vaping Use    Vaping Use: Never used   Substance and Sexual Activity    Alcohol use: Never    Drug use: Never    Sexual activity: Not Currently     Partners: Male     Birth control/protection: Hysterectomy       Medications:     Current Outpatient Medications:     buPROPion XL (FORFIVO XL) 450 MG 24 hr tablet, Take 1 tablet by mouth Daily., Disp: 90 tablet, Rfl:  1    fluticasone (FLONASE) 50 MCG/ACT nasal spray, 2 sprays into the nostril(s) as directed by provider Daily., Disp: , Rfl:     levothyroxine (SYNTHROID, LEVOTHROID) 150 MCG tablet, Take 1 tablet by mouth Daily., Disp: 90 tablet, Rfl: 1    metFORMIN (Glucophage) 500 MG tablet, 1 tablet daily with breakfast x 2 weeks then increase to 1 tab BID (Patient taking differently: 1 tablet daily with breakfast x 2 weeks then increase to 1 tab BID Has not started Rx yet.), Disp: 180 tablet, Rfl: 1    mirtazapine (REMERON) 15 MG tablet, Take 1 tablet by mouth Every Night., Disp: 90 tablet, Rfl: 1    propranolol (INDERAL) 10 MG tablet, Take 1 tablet by mouth 2 (Two) Times a Day., Disp: , Rfl:     propranolol LA (INDERAL LA) 160 MG 24 hr capsule, Take 1 capsule by mouth Daily., Disp: 90 each, Rfl: 1    venlafaxine XR (EFFEXOR-XR) 150 MG 24 hr capsule, Take 1 capsule by mouth Daily., Disp: 90 capsule, Rfl: 1    Vitamin D, Cholecalciferol, 25 MCG (1000 UT) capsule, Take  by mouth., Disp: , Rfl:     pregabalin (Lyrica) 25 MG capsule, Take 1 capsule by mouth 2 (Two) Times a Day., Disp: 180 capsule, Rfl: 3    Allergies:   Allergies   Allergen Reactions    Fish-Derived Products Itching    Lortab [Hydrocodone-Acetaminophen] Itching    Morphine GI Intolerance    Shellfish-Derived Products Itching       PHQ-9 Total Score:     STEADI Fall Risk Assessment has not been completed.    Objective     Physical Exam:   Physical Exam  Vitals reviewed.   Eyes:      Extraocular Movements: EOM normal.      Pupils: Pupils are equal, round, and reactive to light.   Neurological:      Mental Status: She is oriented to person, place, and time.      Coordination: Finger-Nose-Finger Test, Heel to Shin Test and Romberg Test normal.      Gait: Gait is intact. Tandem walk normal.      Deep Tendon Reflexes:      Reflex Scores:       Tricep reflexes are 2+ on the right side and 2+ on the left side.       Bicep reflexes are 2+ on the right side and 2+ on the  left side.       Brachioradialis reflexes are 2+ on the right side and 2+ on the left side.       Patellar reflexes are 2+ on the right side and 2+ on the left side.       Achilles reflexes are 2+ on the right side and 2+ on the left side.  Psychiatric:         Speech: Speech normal.         Neurologic Exam     Mental Status   Oriented to person, place, and time.   Attention: normal. Concentration: normal.   Speech: speech is normal   Level of consciousness: alert  Knowledge: good.     Cranial Nerves     CN II   Visual fields full to confrontation.     CN III, IV, VI   Pupils are equal, round, and reactive to light.  Extraocular motions are normal.   CN III: no CN III palsy  CN VI: no CN VI palsy    CN V   Facial sensation intact.     CN VII   Facial expression full, symmetric.     CN VIII   CN VIII normal.     CN IX, X   CN IX normal.   CN X normal.     CN XI   CN XI normal.     CN XII   CN XII normal.     Motor Exam     Strength   Right neck flexion: 5/5  Left neck flexion: 5/5  Right neck extension: 5/5  Left neck extension: 5/5  Right deltoid: 5/5  Left deltoid: 5/5  Right biceps: 5/5  Left biceps: 5/5  Right triceps: 5/5  Left triceps: 5/5  Right wrist flexion: 5/5  Left wrist flexion: 5/5  Right wrist extension: 5/5  Left wrist extension: 5/5  Right interossei: 5/5  Left interossei: 5/5  Right abdominals: 5/5  Left abdominals: 5/5  Right iliopsoas: 5/5  Left iliopsoas: 5/5  Right quadriceps: 5/5  Left quadriceps: 5/5  Right hamstrin/5  Left hamstrin/5  Right glutei: 5/5  Left glutei: 5/5  Right anterior tibial: 5/5  Left anterior tibial: 5/5  Right posterior tibial: 5/5  Left posterior tibial: 5/5  Right peroneal: 5/5  Left peroneal: 5/5  Right gastroc: 5/5  Left gastroc: 5/5    Sensory Exam   Right leg light touch: decreased from knee  Left leg light touch: decreased from knee  Right leg proprioception: decreased from ankle  Left leg proprioception: decreased from ankle  Right leg pinprick: decreased  "from knee  Left leg pinprick: decreased from knee    Gait, Coordination, and Reflexes     Gait  Gait: normal    Coordination   Romberg: negative  Finger to nose coordination: normal  Heel to shin coordination: normal  Tandem walking coordination: normal    Tremor   Resting tremor: absent  Intention tremor: absent  Action tremor: absent    Reflexes   Right brachioradialis: 2+  Left brachioradialis: 2+  Right biceps: 2+  Left biceps: 2+  Right triceps: 2+  Left triceps: 2+  Right patellar: 2+  Left patellar: 2+  Right achilles: 2+  Left achilles: 2+  Right : 2+  Left : 2+       Vital Signs:   Vitals:    10/12/23 1050   BP: 128/70   Pulse: 80   SpO2: 97%   Weight: (!) 145 kg (319 lb)   Height: 170.2 cm (67.01\")     Body mass index is 49.95 kg/mý.     Results:   Imaging:   No Images in the past 120 days found..     Labs:    No results found for: \"CMP\", \"PROTEIN\", \"ANTIMOGAB\", \"QITBTK0BAYQ\", \"JCVRESULT\", \"QUANTTBGOLD\", \"CBCDIF\", \"IGGALBSER\"     Assessment / Plan      Assessment/Plan:   Diagnoses and all orders for this visit:    1. Idiopathic peripheral neuropathy (Primary)  -     pregabalin (Lyrica) 25 MG capsule; Take 1 capsule by mouth 2 (Two) Times a Day.  Dispense: 180 capsule; Refill: 3    2. Controlled substance agreement signed  -     Urine Drug Screen - Urine, Clean Catch; Future           Patient Education:     Reviewed medications, potential side effects and signs and symptoms to report. Discussed risk versus benefits of treatment plan with patient and/or family-including medications, labs and radiology that may be ordered. Addressed questions and concerns during visit. Patient and/or family verbalized understanding and agree with plan. Instructed to call the office with any questions and report to ER with any life-threatening symptoms.     Follow Up:   Return in about 3 months (around 1/12/2024).    I spent 30  minutes face to face with the patient. I personally spent 50 percent of this time " counseling and discussing diagnosis, diagnostic testing, driving, treatment options, and management .       During this visit the following were done:  Labs Reviewed []    Labs Ordered [x]    Radiology Reports Reviewed []    Radiology Ordered []    PCP Records Reviewed []    Referring Provider Records Reviewed [x]    ER Records Reviewed []    Hospital Records Reviewed []    History Obtained From Family []    Radiology Images Reviewed []    Other Reviewed [x]    Records Requested []      DELLA Martin  [facility] NEUROLOGY  [address] [place] [id number] [phone]

## 2023-10-12 ENCOUNTER — LAB (OUTPATIENT)
Dept: LAB | Facility: HOSPITAL | Age: 58
End: 2023-10-12
Payer: MEDICAID

## 2023-10-12 ENCOUNTER — OFFICE VISIT (OUTPATIENT)
Dept: NEUROLOGY | Facility: CLINIC | Age: 58
End: 2023-10-12
Payer: MEDICAID

## 2023-10-12 VITALS
DIASTOLIC BLOOD PRESSURE: 70 MMHG | BODY MASS INDEX: 45.99 KG/M2 | HEART RATE: 80 BPM | HEIGHT: 67 IN | SYSTOLIC BLOOD PRESSURE: 128 MMHG | WEIGHT: 293 LBS | OXYGEN SATURATION: 97 %

## 2023-10-12 DIAGNOSIS — G60.9 IDIOPATHIC PERIPHERAL NEUROPATHY: Primary | ICD-10-CM

## 2023-10-12 DIAGNOSIS — I10 ESSENTIAL HYPERTENSION: ICD-10-CM

## 2023-10-12 DIAGNOSIS — Z79.899 CONTROLLED SUBSTANCE AGREEMENT SIGNED: ICD-10-CM

## 2023-10-12 DIAGNOSIS — E03.9 ACQUIRED HYPOTHYROIDISM: ICD-10-CM

## 2023-10-12 DIAGNOSIS — E78.2 MIXED HYPERLIPIDEMIA: ICD-10-CM

## 2023-10-12 DIAGNOSIS — R73.03 PREDIABETES: ICD-10-CM

## 2023-10-12 LAB
ALBUMIN SERPL-MCNC: 4.1 G/DL (ref 3.5–5.2)
ALBUMIN/GLOB SERPL: 1.3 G/DL
ALP SERPL-CCNC: 156 U/L (ref 39–117)
ALT SERPL W P-5'-P-CCNC: 23 U/L (ref 1–33)
AMPHET+METHAMPHET UR QL: NEGATIVE
AMPHETAMINES UR QL: NEGATIVE
ANION GAP SERPL CALCULATED.3IONS-SCNC: 10.1 MMOL/L (ref 5–15)
AST SERPL-CCNC: 21 U/L (ref 1–32)
BARBITURATES UR QL SCN: NEGATIVE
BASOPHILS # BLD AUTO: 0.09 10*3/MM3 (ref 0–0.2)
BASOPHILS NFR BLD AUTO: 0.8 % (ref 0–1.5)
BENZODIAZ UR QL SCN: NEGATIVE
BILIRUB SERPL-MCNC: <0.2 MG/DL (ref 0–1.2)
BUN SERPL-MCNC: 17 MG/DL (ref 6–20)
BUN/CREAT SERPL: 13.9 (ref 7–25)
BUPRENORPHINE SERPL-MCNC: NEGATIVE NG/ML
CALCIUM SPEC-SCNC: 9.5 MG/DL (ref 8.6–10.5)
CANNABINOIDS SERPL QL: NEGATIVE
CHLORIDE SERPL-SCNC: 105 MMOL/L (ref 98–107)
CHOLEST SERPL-MCNC: 242 MG/DL (ref 0–200)
CO2 SERPL-SCNC: 25.9 MMOL/L (ref 22–29)
COCAINE UR QL: NEGATIVE
CREAT SERPL-MCNC: 1.22 MG/DL (ref 0.57–1)
DEPRECATED RDW RBC AUTO: 44.2 FL (ref 37–54)
EGFRCR SERPLBLD CKD-EPI 2021: 51.9 ML/MIN/1.73
EOSINOPHIL # BLD AUTO: 0.16 10*3/MM3 (ref 0–0.4)
EOSINOPHIL NFR BLD AUTO: 1.4 % (ref 0.3–6.2)
ERYTHROCYTE [DISTWIDTH] IN BLOOD BY AUTOMATED COUNT: 13.8 % (ref 12.3–15.4)
FENTANYL UR-MCNC: NEGATIVE NG/ML
GLOBULIN UR ELPH-MCNC: 3.1 GM/DL
GLUCOSE SERPL-MCNC: 82 MG/DL (ref 65–99)
HBA1C MFR BLD: 6 % (ref 4.8–5.6)
HCT VFR BLD AUTO: 39.8 % (ref 34–46.6)
HDLC SERPL-MCNC: 38 MG/DL (ref 40–60)
HGB BLD-MCNC: 13.4 G/DL (ref 12–15.9)
IMM GRANULOCYTES # BLD AUTO: 0.05 10*3/MM3 (ref 0–0.05)
IMM GRANULOCYTES NFR BLD AUTO: 0.4 % (ref 0–0.5)
LDLC SERPL CALC-MCNC: 144 MG/DL (ref 0–100)
LDLC/HDLC SERPL: 3.65 {RATIO}
LYMPHOCYTES # BLD AUTO: 2.15 10*3/MM3 (ref 0.7–3.1)
LYMPHOCYTES NFR BLD AUTO: 18.8 % (ref 19.6–45.3)
MCH RBC QN AUTO: 29.8 PG (ref 26.6–33)
MCHC RBC AUTO-ENTMCNC: 33.7 G/DL (ref 31.5–35.7)
MCV RBC AUTO: 88.4 FL (ref 79–97)
METHADONE UR QL SCN: NEGATIVE
MONOCYTES # BLD AUTO: 0.69 10*3/MM3 (ref 0.1–0.9)
MONOCYTES NFR BLD AUTO: 6 % (ref 5–12)
NEUTROPHILS NFR BLD AUTO: 72.6 % (ref 42.7–76)
NEUTROPHILS NFR BLD AUTO: 8.29 10*3/MM3 (ref 1.7–7)
NRBC BLD AUTO-RTO: 0 /100 WBC (ref 0–0.2)
OPIATES UR QL: NEGATIVE
OXYCODONE UR QL SCN: NEGATIVE
PCP UR QL SCN: NEGATIVE
PLATELET # BLD AUTO: 355 10*3/MM3 (ref 140–450)
PMV BLD AUTO: 10.5 FL (ref 6–12)
POTASSIUM SERPL-SCNC: 4.9 MMOL/L (ref 3.5–5.2)
PROPOXYPH UR QL: NEGATIVE
PROT SERPL-MCNC: 7.2 G/DL (ref 6–8.5)
RBC # BLD AUTO: 4.5 10*6/MM3 (ref 3.77–5.28)
SODIUM SERPL-SCNC: 141 MMOL/L (ref 136–145)
TRICYCLICS UR QL SCN: NEGATIVE
TRIGL SERPL-MCNC: 326 MG/DL (ref 0–150)
TSH SERPL DL<=0.05 MIU/L-ACNC: 2.52 UIU/ML (ref 0.27–4.2)
VLDLC SERPL-MCNC: 60 MG/DL (ref 5–40)
WBC NRBC COR # BLD: 11.43 10*3/MM3 (ref 3.4–10.8)

## 2023-10-12 PROCEDURE — 80307 DRUG TEST PRSMV CHEM ANLYZR: CPT

## 2023-10-12 PROCEDURE — 3074F SYST BP LT 130 MM HG: CPT | Performed by: NURSE PRACTITIONER

## 2023-10-12 PROCEDURE — 80053 COMPREHEN METABOLIC PANEL: CPT

## 2023-10-12 PROCEDURE — 83036 HEMOGLOBIN GLYCOSYLATED A1C: CPT

## 2023-10-12 PROCEDURE — 80061 LIPID PANEL: CPT

## 2023-10-12 PROCEDURE — 85025 COMPLETE CBC W/AUTO DIFF WBC: CPT

## 2023-10-12 PROCEDURE — 3078F DIAST BP <80 MM HG: CPT | Performed by: NURSE PRACTITIONER

## 2023-10-12 PROCEDURE — 84443 ASSAY THYROID STIM HORMONE: CPT

## 2023-10-12 PROCEDURE — 1160F RVW MEDS BY RX/DR IN RCRD: CPT | Performed by: NURSE PRACTITIONER

## 2023-10-12 PROCEDURE — 1159F MED LIST DOCD IN RCRD: CPT | Performed by: NURSE PRACTITIONER

## 2023-10-12 PROCEDURE — 99214 OFFICE O/P EST MOD 30 MIN: CPT | Performed by: NURSE PRACTITIONER

## 2023-10-12 RX ORDER — PREGABALIN 25 MG/1
25 CAPSULE ORAL 2 TIMES DAILY
Qty: 180 CAPSULE | Refills: 3 | Status: SHIPPED | OUTPATIENT
Start: 2023-10-12 | End: 2024-10-11

## 2023-10-17 DIAGNOSIS — N28.9 ABNORMAL KIDNEY FUNCTION: Primary | ICD-10-CM

## 2023-10-17 DIAGNOSIS — G60.9 IDIOPATHIC PERIPHERAL NEUROPATHY: ICD-10-CM

## 2023-11-16 ENCOUNTER — TELEPHONE (OUTPATIENT)
Dept: INTERNAL MEDICINE | Facility: CLINIC | Age: 58
End: 2023-11-16
Payer: MEDICAID

## 2023-11-16 NOTE — TELEPHONE ENCOUNTER
CALLED PT TO RESCHEDULE APPT WITH BRIGITTE HINES 11/17/23 AS PROVIDER WILL BE OUT OF THE OFFICE.    LEFT VM WITH OFFICE NUMBER TO RETURN CALL.

## 2024-01-15 ENCOUNTER — TELEPHONE (OUTPATIENT)
Dept: NEUROLOGY | Facility: CLINIC | Age: 59
End: 2024-01-15
Payer: MEDICAID

## 2024-01-15 NOTE — TELEPHONE ENCOUNTER
----- Message from Jessica Ugalde sent at 1/14/2024 11:37 AM EST -----  Regarding: Appointment Cancellation Request  Contact: [phone]  Jessica Ugalde would like to cancel the following appointments:    Nandini Ferris [place] [facility] [place] ([phone]), 1/16/2024 10:00 AM    Comments:  This is a followup re: medication prescribed for my neuropathy.  I decided not to try medication. I will send portal msg to provider.

## 2024-02-08 ENCOUNTER — TRANSCRIBE ORDERS (OUTPATIENT)
Dept: ADMINISTRATIVE | Facility: HOSPITAL | Age: 59
End: 2024-02-08
Payer: MEDICAID

## 2024-02-08 DIAGNOSIS — Z12.31 BREAST CANCER SCREENING BY MAMMOGRAM: Primary | ICD-10-CM

## 2024-02-10 DIAGNOSIS — F41.9 ANXIETY: ICD-10-CM

## 2024-02-10 DIAGNOSIS — I10 ESSENTIAL HYPERTENSION: ICD-10-CM

## 2024-02-10 DIAGNOSIS — E03.9 ACQUIRED HYPOTHYROIDISM: ICD-10-CM

## 2024-02-10 DIAGNOSIS — F33.41 RECURRENT MAJOR DEPRESSIVE DISORDER, IN PARTIAL REMISSION: ICD-10-CM

## 2024-02-12 DIAGNOSIS — E03.9 ACQUIRED HYPOTHYROIDISM: ICD-10-CM

## 2024-02-12 DIAGNOSIS — F33.41 RECURRENT MAJOR DEPRESSIVE DISORDER, IN PARTIAL REMISSION: ICD-10-CM

## 2024-02-12 DIAGNOSIS — G47.00 INSOMNIA, UNSPECIFIED TYPE: ICD-10-CM

## 2024-02-12 DIAGNOSIS — F41.9 ANXIETY: ICD-10-CM

## 2024-02-12 DIAGNOSIS — I10 ESSENTIAL HYPERTENSION: ICD-10-CM

## 2024-02-12 RX ORDER — PROPRANOLOL HYDROCHLORIDE 160 MG/1
160 CAPSULE, EXTENDED RELEASE ORAL DAILY
Qty: 90 CAPSULE | Refills: 0 | Status: CANCELLED | OUTPATIENT
Start: 2024-02-12

## 2024-02-12 RX ORDER — BUPROPION HYDROCHLORIDE 450 MG/1
450 TABLET, FILM COATED, EXTENDED RELEASE ORAL DAILY
Qty: 90 TABLET | Refills: 0 | Status: CANCELLED | OUTPATIENT
Start: 2024-02-12

## 2024-02-12 RX ORDER — LEVOTHYROXINE SODIUM 0.15 MG/1
150 TABLET ORAL DAILY
Qty: 90 TABLET | Refills: 0 | Status: CANCELLED | OUTPATIENT
Start: 2024-02-12

## 2024-02-12 RX ORDER — PROPRANOLOL HYDROCHLORIDE 160 MG/1
160 CAPSULE, EXTENDED RELEASE ORAL DAILY
Qty: 90 CAPSULE | Refills: 0 | Status: SHIPPED | OUTPATIENT
Start: 2024-02-12

## 2024-02-12 RX ORDER — VENLAFAXINE HYDROCHLORIDE 150 MG/1
150 CAPSULE, EXTENDED RELEASE ORAL DAILY
Qty: 90 CAPSULE | Refills: 0 | Status: CANCELLED | OUTPATIENT
Start: 2024-02-12

## 2024-02-12 RX ORDER — VENLAFAXINE HYDROCHLORIDE 150 MG/1
150 CAPSULE, EXTENDED RELEASE ORAL DAILY
Qty: 90 CAPSULE | Refills: 0 | Status: SHIPPED | OUTPATIENT
Start: 2024-02-12

## 2024-02-12 RX ORDER — MIRTAZAPINE 15 MG/1
15 TABLET, FILM COATED ORAL NIGHTLY
Qty: 90 TABLET | Refills: 1 | Status: CANCELLED | OUTPATIENT
Start: 2024-02-12

## 2024-02-12 RX ORDER — BUPROPION HYDROCHLORIDE 450 MG/1
450 TABLET, FILM COATED, EXTENDED RELEASE ORAL DAILY
Qty: 90 TABLET | Refills: 0 | Status: SHIPPED | OUTPATIENT
Start: 2024-02-12

## 2024-02-12 RX ORDER — LEVOTHYROXINE SODIUM 0.15 MG/1
150 TABLET ORAL DAILY
Qty: 90 TABLET | Refills: 0 | Status: SHIPPED | OUTPATIENT
Start: 2024-02-12

## 2024-03-18 DIAGNOSIS — G47.00 INSOMNIA, UNSPECIFIED TYPE: ICD-10-CM

## 2024-03-19 RX ORDER — MIRTAZAPINE 15 MG/1
15 TABLET, FILM COATED ORAL NIGHTLY
Qty: 30 TABLET | Refills: 2 | Status: SHIPPED | OUTPATIENT
Start: 2024-03-19

## 2024-03-19 NOTE — TELEPHONE ENCOUNTER
Last Appt: 8/1/2023  Next Appt: no appt - missed appt today 3/19/2024    Medication: remeron   Last refill: 8/1/2023  # of refills: 90 / 1    Pharmacy:   [facility] [id number] - [address] [facility] [phone] [facility] [phone] FX

## 2024-04-29 DIAGNOSIS — J02.9 SORE THROAT: ICD-10-CM

## 2024-04-29 DIAGNOSIS — J39.2 LESION OF THROAT: Primary | ICD-10-CM

## 2024-05-17 DIAGNOSIS — F41.9 ANXIETY: ICD-10-CM

## 2024-05-17 DIAGNOSIS — F33.41 RECURRENT MAJOR DEPRESSIVE DISORDER, IN PARTIAL REMISSION: ICD-10-CM

## 2024-05-17 DIAGNOSIS — E03.9 ACQUIRED HYPOTHYROIDISM: ICD-10-CM

## 2024-05-17 DIAGNOSIS — I10 ESSENTIAL HYPERTENSION: ICD-10-CM

## 2024-05-17 RX ORDER — PROPRANOLOL HYDROCHLORIDE 160 MG/1
160 CAPSULE, EXTENDED RELEASE ORAL DAILY
Qty: 30 CAPSULE | Refills: 0 | Status: SHIPPED | OUTPATIENT
Start: 2024-05-17

## 2024-05-17 RX ORDER — BUPROPION HYDROCHLORIDE 450 MG/1
450 TABLET, FILM COATED, EXTENDED RELEASE ORAL DAILY
Qty: 30 TABLET | Refills: 0 | Status: SHIPPED | OUTPATIENT
Start: 2024-05-17

## 2024-05-17 RX ORDER — VENLAFAXINE HYDROCHLORIDE 150 MG/1
150 CAPSULE, EXTENDED RELEASE ORAL DAILY
Qty: 30 CAPSULE | Refills: 0 | Status: SHIPPED | OUTPATIENT
Start: 2024-05-17

## 2024-05-17 RX ORDER — LEVOTHYROXINE SODIUM 0.15 MG/1
150 TABLET ORAL DAILY
Qty: 30 TABLET | Refills: 0 | Status: SHIPPED | OUTPATIENT
Start: 2024-05-17

## 2024-05-17 NOTE — TELEPHONE ENCOUNTER
Rx Refill Note  Requested Prescriptions     Pending Prescriptions Disp Refills    venlafaxine XR (EFFEXOR-XR) 150 MG 24 hr capsule [Pharmacy Med Name: VENLAFAXINE HCL  MG CAP] 90 capsule 0     Sig: TAKE 1 CAPSULE BY MOUTH DAILY    propranolol LA (INDERAL LA) 160 MG 24 hr capsule [Pharmacy Med Name: PROPRANOLOL  MG CAPSULE] 90 capsule 0     Sig: TAKE 1 CAPSULE BY MOUTH DAILY    levothyroxine (SYNTHROID, LEVOTHROID) 150 MCG tablet [Pharmacy Med Name: LEVOTHYROXINE 150 MCG TABLET] 90 tablet 0     Sig: TAKE 1 TABLET BY MOUTH DAILY    buPROPion XL (FORFIVO XL) 450 MG 24 hr tablet [Pharmacy Med Name: buPROPion HCL  MG TABLET] 90 tablet 0     Sig: TAKE 1 TABLET BY MOUTH DAILY      Last office visit with prescribing clinician: 8/1/2023   Last telemedicine visit with prescribing clinician: Visit date not found   Next office visit with prescribing clinician: Visit date not found     Desiree Ojeda MA  05/17/24, 08:26 EDT

## 2024-06-13 DIAGNOSIS — F41.9 ANXIETY: ICD-10-CM

## 2024-06-13 DIAGNOSIS — E03.9 ACQUIRED HYPOTHYROIDISM: ICD-10-CM

## 2024-06-13 DIAGNOSIS — F33.41 RECURRENT MAJOR DEPRESSIVE DISORDER, IN PARTIAL REMISSION: ICD-10-CM

## 2024-06-13 RX ORDER — BUPROPION HYDROCHLORIDE 450 MG/1
450 TABLET, FILM COATED, EXTENDED RELEASE ORAL DAILY
Qty: 30 TABLET | Refills: 0 | Status: SHIPPED | OUTPATIENT
Start: 2024-06-13

## 2024-06-13 RX ORDER — LEVOTHYROXINE SODIUM 0.15 MG/1
150 TABLET ORAL DAILY
Qty: 30 TABLET | Refills: 0 | Status: SHIPPED | OUTPATIENT
Start: 2024-06-13

## 2024-06-13 NOTE — TELEPHONE ENCOUNTER
Refill request levothyroxine   Last refill 5/17/24    Bupropion  Last refill 5/17/24  Last visit 8/1/23  Next visit 6/25/24

## 2024-06-14 ENCOUNTER — PATIENT MESSAGE (OUTPATIENT)
Dept: INTERNAL MEDICINE | Facility: CLINIC | Age: 59
End: 2024-06-14
Payer: MEDICAID

## 2024-06-14 DIAGNOSIS — I10 ESSENTIAL HYPERTENSION: ICD-10-CM

## 2024-06-14 DIAGNOSIS — F33.41 RECURRENT MAJOR DEPRESSIVE DISORDER, IN PARTIAL REMISSION: ICD-10-CM

## 2024-06-14 DIAGNOSIS — F41.9 ANXIETY: ICD-10-CM

## 2024-06-14 DIAGNOSIS — G47.00 INSOMNIA, UNSPECIFIED TYPE: ICD-10-CM

## 2024-06-14 RX ORDER — PROPRANOLOL HYDROCHLORIDE 160 MG/1
160 CAPSULE, EXTENDED RELEASE ORAL DAILY
Qty: 30 CAPSULE | Refills: 0 | OUTPATIENT
Start: 2024-06-14

## 2024-06-14 RX ORDER — PROPRANOLOL HYDROCHLORIDE 160 MG/1
160 CAPSULE, EXTENDED RELEASE ORAL DAILY
Qty: 30 CAPSULE | Refills: 0 | Status: SHIPPED | OUTPATIENT
Start: 2024-06-14

## 2024-06-14 RX ORDER — VENLAFAXINE HYDROCHLORIDE 150 MG/1
150 CAPSULE, EXTENDED RELEASE ORAL DAILY
Qty: 30 CAPSULE | Refills: 0 | OUTPATIENT
Start: 2024-06-14

## 2024-06-14 RX ORDER — MIRTAZAPINE 15 MG/1
15 TABLET, FILM COATED ORAL NIGHTLY
Qty: 30 TABLET | Refills: 2 | Status: SHIPPED | OUTPATIENT
Start: 2024-06-14

## 2024-06-14 RX ORDER — MIRTAZAPINE 15 MG/1
15 TABLET, FILM COATED ORAL NIGHTLY
Qty: 30 TABLET | Refills: 2 | OUTPATIENT
Start: 2024-06-14

## 2024-06-14 RX ORDER — VENLAFAXINE HYDROCHLORIDE 150 MG/1
150 CAPSULE, EXTENDED RELEASE ORAL DAILY
Qty: 30 CAPSULE | Refills: 0 | Status: SHIPPED | OUTPATIENT
Start: 2024-06-14

## 2024-06-14 NOTE — TELEPHONE ENCOUNTER
From: Jessica Ugalde  To: Elsa Timmy  Sent: 6/14/2024 2:39 PM EDT  Subject: Rx refill request    The pharmacy received a denial for the refill request for Propranolol 160mg, Mirtazapine 15 mg and Venlafaxine. I have an appt scheduled for Tues June 25th at 9:00 am. I assure you I will be there. I totally understand with my history of previous appts. I will be 4 days short for these prescriptions. I tried to get in prior to running out but there was no appt available.

## 2024-06-18 DIAGNOSIS — F41.9 ANXIETY: ICD-10-CM

## 2024-06-18 DIAGNOSIS — F33.41 RECURRENT MAJOR DEPRESSIVE DISORDER, IN PARTIAL REMISSION: ICD-10-CM

## 2024-06-18 DIAGNOSIS — I10 ESSENTIAL HYPERTENSION: ICD-10-CM

## 2024-06-18 DIAGNOSIS — G47.00 INSOMNIA, UNSPECIFIED TYPE: ICD-10-CM

## 2024-06-18 RX ORDER — VENLAFAXINE HYDROCHLORIDE 150 MG/1
150 CAPSULE, EXTENDED RELEASE ORAL DAILY
Qty: 30 CAPSULE | Refills: 0 | OUTPATIENT
Start: 2024-06-18

## 2024-06-18 RX ORDER — PROPRANOLOL HYDROCHLORIDE 160 MG/1
160 CAPSULE, EXTENDED RELEASE ORAL DAILY
Qty: 30 CAPSULE | Refills: 0 | OUTPATIENT
Start: 2024-06-18

## 2024-06-18 RX ORDER — MIRTAZAPINE 15 MG/1
15 TABLET, FILM COATED ORAL NIGHTLY
Qty: 30 TABLET | Refills: 2 | OUTPATIENT
Start: 2024-06-18

## 2024-06-25 ENCOUNTER — OFFICE VISIT (OUTPATIENT)
Dept: INTERNAL MEDICINE | Facility: CLINIC | Age: 59
End: 2024-06-25
Payer: MEDICAID

## 2024-06-25 ENCOUNTER — LAB (OUTPATIENT)
Dept: INTERNAL MEDICINE | Facility: CLINIC | Age: 59
End: 2024-06-25
Payer: MEDICAID

## 2024-06-25 VITALS
OXYGEN SATURATION: 95 % | SYSTOLIC BLOOD PRESSURE: 126 MMHG | RESPIRATION RATE: 22 BRPM | HEIGHT: 67 IN | DIASTOLIC BLOOD PRESSURE: 80 MMHG | HEART RATE: 54 BPM | TEMPERATURE: 97.6 F | WEIGHT: 293 LBS | BODY MASS INDEX: 45.99 KG/M2

## 2024-06-25 DIAGNOSIS — G60.8 PERIPHERAL SENSORY-MOTOR AXONAL POLYNEUROPATHY: ICD-10-CM

## 2024-06-25 DIAGNOSIS — E78.2 MIXED HYPERLIPIDEMIA: ICD-10-CM

## 2024-06-25 DIAGNOSIS — Z00.00 ROUTINE GENERAL MEDICAL EXAMINATION AT A HEALTH CARE FACILITY: Primary | ICD-10-CM

## 2024-06-25 DIAGNOSIS — N28.9 ABNORMAL KIDNEY FUNCTION: ICD-10-CM

## 2024-06-25 DIAGNOSIS — F41.9 ANXIETY: ICD-10-CM

## 2024-06-25 DIAGNOSIS — Z00.00 ROUTINE GENERAL MEDICAL EXAMINATION AT A HEALTH CARE FACILITY: ICD-10-CM

## 2024-06-25 DIAGNOSIS — E53.8 VITAMIN B12 DEFICIENCY (NON ANEMIC): ICD-10-CM

## 2024-06-25 DIAGNOSIS — E55.9 VITAMIN D DEFICIENCY: ICD-10-CM

## 2024-06-25 DIAGNOSIS — Z12.31 ENCOUNTER FOR SCREENING MAMMOGRAM FOR BREAST CANCER: ICD-10-CM

## 2024-06-25 DIAGNOSIS — R26.89 BALANCE PROBLEM: ICD-10-CM

## 2024-06-25 DIAGNOSIS — G47.00 INSOMNIA, UNSPECIFIED TYPE: ICD-10-CM

## 2024-06-25 DIAGNOSIS — E03.9 ACQUIRED HYPOTHYROIDISM: ICD-10-CM

## 2024-06-25 DIAGNOSIS — J30.9 ALLERGIC RHINITIS, UNSPECIFIED SEASONALITY, UNSPECIFIED TRIGGER: ICD-10-CM

## 2024-06-25 DIAGNOSIS — I10 ESSENTIAL HYPERTENSION: ICD-10-CM

## 2024-06-25 DIAGNOSIS — E66.01 CLASS 3 SEVERE OBESITY DUE TO EXCESS CALORIES WITHOUT SERIOUS COMORBIDITY WITH BODY MASS INDEX (BMI) OF 50.0 TO 59.9 IN ADULT: ICD-10-CM

## 2024-06-25 DIAGNOSIS — R73.03 PREDIABETES: ICD-10-CM

## 2024-06-25 DIAGNOSIS — F33.41 RECURRENT MAJOR DEPRESSIVE DISORDER, IN PARTIAL REMISSION: ICD-10-CM

## 2024-06-25 DIAGNOSIS — G60.9 IDIOPATHIC PERIPHERAL NEUROPATHY: ICD-10-CM

## 2024-06-25 PROBLEM — R19.7 DIARRHEA: Status: RESOLVED | Noted: 2021-06-22 | Resolved: 2024-06-25

## 2024-06-25 LAB
25(OH)D3 SERPL-MCNC: 19.2 NG/ML (ref 30–100)
ALBUMIN SERPL-MCNC: 3.9 G/DL (ref 3.5–5.2)
ALBUMIN/GLOB SERPL: 1.3 G/DL
ALP SERPL-CCNC: 145 U/L (ref 39–117)
ALT SERPL W P-5'-P-CCNC: 22 U/L (ref 1–33)
ANION GAP SERPL CALCULATED.3IONS-SCNC: 11.5 MMOL/L (ref 5–15)
AST SERPL-CCNC: 22 U/L (ref 1–32)
BASOPHILS # BLD AUTO: 0.07 10*3/MM3 (ref 0–0.2)
BASOPHILS NFR BLD AUTO: 0.6 % (ref 0–1.5)
BILIRUB SERPL-MCNC: 0.3 MG/DL (ref 0–1.2)
BUN SERPL-MCNC: 13 MG/DL (ref 6–20)
BUN/CREAT SERPL: 11.8 (ref 7–25)
CALCIUM SPEC-SCNC: 9.5 MG/DL (ref 8.6–10.5)
CHLORIDE SERPL-SCNC: 106 MMOL/L (ref 98–107)
CHOLEST SERPL-MCNC: 241 MG/DL (ref 0–200)
CO2 SERPL-SCNC: 23.5 MMOL/L (ref 22–29)
CREAT SERPL-MCNC: 1.1 MG/DL (ref 0.57–1)
CRP SERPL-MCNC: 2.66 MG/DL (ref 0–0.5)
DEPRECATED RDW RBC AUTO: 43.3 FL (ref 37–54)
EGFRCR SERPLBLD CKD-EPI 2021: 58.4 ML/MIN/1.73
EOSINOPHIL # BLD AUTO: 0.2 10*3/MM3 (ref 0–0.4)
EOSINOPHIL NFR BLD AUTO: 1.7 % (ref 0.3–6.2)
ERYTHROCYTE [DISTWIDTH] IN BLOOD BY AUTOMATED COUNT: 13.4 % (ref 12.3–15.4)
ERYTHROCYTE [SEDIMENTATION RATE] IN BLOOD: 51 MM/HR (ref 0–30)
GLOBULIN UR ELPH-MCNC: 3.1 GM/DL
GLUCOSE SERPL-MCNC: 107 MG/DL (ref 65–99)
HBA1C MFR BLD: 6.2 % (ref 4.8–5.6)
HCT VFR BLD AUTO: 39.6 % (ref 34–46.6)
HDLC SERPL-MCNC: 37 MG/DL (ref 40–60)
HGB BLD-MCNC: 13.2 G/DL (ref 12–15.9)
IMM GRANULOCYTES # BLD AUTO: 0.04 10*3/MM3 (ref 0–0.05)
IMM GRANULOCYTES NFR BLD AUTO: 0.3 % (ref 0–0.5)
LDLC SERPL CALC-MCNC: 164 MG/DL (ref 0–100)
LDLC/HDLC SERPL: 4.36 {RATIO}
LYMPHOCYTES # BLD AUTO: 2.25 10*3/MM3 (ref 0.7–3.1)
LYMPHOCYTES NFR BLD AUTO: 19.1 % (ref 19.6–45.3)
MCH RBC QN AUTO: 29.7 PG (ref 26.6–33)
MCHC RBC AUTO-ENTMCNC: 33.3 G/DL (ref 31.5–35.7)
MCV RBC AUTO: 89 FL (ref 79–97)
MONOCYTES # BLD AUTO: 0.66 10*3/MM3 (ref 0.1–0.9)
MONOCYTES NFR BLD AUTO: 5.6 % (ref 5–12)
NEUTROPHILS NFR BLD AUTO: 72.7 % (ref 42.7–76)
NEUTROPHILS NFR BLD AUTO: 8.56 10*3/MM3 (ref 1.7–7)
NRBC BLD AUTO-RTO: 0 /100 WBC (ref 0–0.2)
PLATELET # BLD AUTO: 348 10*3/MM3 (ref 140–450)
PMV BLD AUTO: 10.3 FL (ref 6–12)
POTASSIUM SERPL-SCNC: 4.7 MMOL/L (ref 3.5–5.2)
PROT SERPL-MCNC: 7 G/DL (ref 6–8.5)
RBC # BLD AUTO: 4.45 10*6/MM3 (ref 3.77–5.28)
SODIUM SERPL-SCNC: 141 MMOL/L (ref 136–145)
TRIGL SERPL-MCNC: 214 MG/DL (ref 0–150)
TSH SERPL DL<=0.05 MIU/L-ACNC: 3.17 UIU/ML (ref 0.27–4.2)
VIT B12 BLD-MCNC: 279 PG/ML (ref 211–946)
VLDLC SERPL-MCNC: 40 MG/DL (ref 5–40)
WBC NRBC COR # BLD AUTO: 11.78 10*3/MM3 (ref 3.4–10.8)

## 2024-06-25 PROCEDURE — 80061 LIPID PANEL: CPT | Performed by: PHYSICIAN ASSISTANT

## 2024-06-25 PROCEDURE — 1159F MED LIST DOCD IN RCRD: CPT | Performed by: PHYSICIAN ASSISTANT

## 2024-06-25 PROCEDURE — 82306 VITAMIN D 25 HYDROXY: CPT | Performed by: PHYSICIAN ASSISTANT

## 2024-06-25 PROCEDURE — 36415 COLL VENOUS BLD VENIPUNCTURE: CPT | Performed by: PHYSICIAN ASSISTANT

## 2024-06-25 PROCEDURE — 99396 PREV VISIT EST AGE 40-64: CPT | Performed by: PHYSICIAN ASSISTANT

## 2024-06-25 PROCEDURE — 1125F AMNT PAIN NOTED PAIN PRSNT: CPT | Performed by: PHYSICIAN ASSISTANT

## 2024-06-25 PROCEDURE — 85025 COMPLETE CBC W/AUTO DIFF WBC: CPT | Performed by: PHYSICIAN ASSISTANT

## 2024-06-25 PROCEDURE — 3079F DIAST BP 80-89 MM HG: CPT | Performed by: PHYSICIAN ASSISTANT

## 2024-06-25 PROCEDURE — 96372 THER/PROPH/DIAG INJ SC/IM: CPT | Performed by: PHYSICIAN ASSISTANT

## 2024-06-25 PROCEDURE — 85652 RBC SED RATE AUTOMATED: CPT | Performed by: PHYSICIAN ASSISTANT

## 2024-06-25 PROCEDURE — 80053 COMPREHEN METABOLIC PANEL: CPT | Performed by: PHYSICIAN ASSISTANT

## 2024-06-25 PROCEDURE — 86140 C-REACTIVE PROTEIN: CPT | Performed by: PHYSICIAN ASSISTANT

## 2024-06-25 PROCEDURE — 83036 HEMOGLOBIN GLYCOSYLATED A1C: CPT | Performed by: PHYSICIAN ASSISTANT

## 2024-06-25 PROCEDURE — 84443 ASSAY THYROID STIM HORMONE: CPT | Performed by: PHYSICIAN ASSISTANT

## 2024-06-25 PROCEDURE — 82607 VITAMIN B-12: CPT

## 2024-06-25 PROCEDURE — 3074F SYST BP LT 130 MM HG: CPT | Performed by: PHYSICIAN ASSISTANT

## 2024-06-25 PROCEDURE — 1160F RVW MEDS BY RX/DR IN RCRD: CPT | Performed by: PHYSICIAN ASSISTANT

## 2024-06-25 RX ORDER — PROPRANOLOL HYDROCHLORIDE 80 MG/1
80 CAPSULE, EXTENDED RELEASE ORAL DAILY
Qty: 30 CAPSULE | Refills: 0 | Status: SHIPPED | OUTPATIENT
Start: 2024-06-25

## 2024-06-25 RX ORDER — CYANOCOBALAMIN 1000 UG/ML
1000 INJECTION, SOLUTION INTRAMUSCULAR; SUBCUTANEOUS
Status: SHIPPED | OUTPATIENT
Start: 2024-06-25

## 2024-06-25 RX ORDER — LISINOPRIL 10 MG/1
10 TABLET ORAL DAILY
Qty: 30 TABLET | Refills: 0 | Status: SHIPPED | OUTPATIENT
Start: 2024-06-25

## 2024-06-25 RX ORDER — DULOXETIN HYDROCHLORIDE 30 MG/1
30 CAPSULE, DELAYED RELEASE ORAL DAILY
Qty: 30 CAPSULE | Refills: 0 | Status: SHIPPED | OUTPATIENT
Start: 2024-06-25

## 2024-06-25 RX ADMIN — CYANOCOBALAMIN 1000 MCG: 1000 INJECTION, SOLUTION INTRAMUSCULAR; SUBCUTANEOUS at 10:20

## 2024-06-25 NOTE — PROGRESS NOTES
Chief Complaint  Annual Exam    Subjective          History of Present Illness  Jessica Ugalde presents to [facility] for a routine physical.  History of Present Illness  Idiopathic Peripheral polyneuropathy:  Sx started 10 years ago with hand/feet tingling/pain. Feet are getting worse and she now does not feel the front of her feet. Her right leg also has numbness extending up the shin. Over the last year has had worsening weakness of legs and difficulty with .   Previously her hand neuropathy was resolved with cubital tunnel syndrome surgery but over the last several months it has returned and she has weakness and numbness in her hands in the first 3 fingers.   She was previously worked up by [facility] for peripheral neuropathy in her feet when sx started years ago, EMG was nl at that time. Has numbness on all of her toes and the bottom of her foot and also the top of her foot and up the shin some, worse on her right than the left. She does not feel pain or temperature on her feet. It does affect her balance, is nervous to go up and down stairs and walk around in public.  Did see neurology and they suggested Lyrica, she tried/failed neurontin in the past.   Has stabbing nerve pain and tingling in hands/feet.    Hypothyroid:  Has been well controled on synthroid 150 for a long time.   Lab Results       Component                Value               Date                       TSH                      2.520               10/12/2023                            HTN:  Controlled on propranolol LA 160mg, has been on it for 10 years, checks BP at home and it is running 120-135/80s  No CP/SOA. No hx of cardiac work up before, no echo/stress test. PGF  age 65 of AMI.     PreDM:  Lab Results       Component                Value               Date                       HGBA1C                   6.00 (H)            10/12/2023              Obesity:  She works from home and does  not get out much, she has a hx of binge eating. She has been doing stability exercises that she found online. Is wanting to get out and exercise more but is worried about falling with her neuropathy.     Anx/Dep/insomnia:  She is sleeping a lot during the day, is tired all the time and fatigued, she is working remotely and not leaving her house much now.   Currently on effexor 150 (for the last 10+ years), wellbutrin 450, and Remeron 15.  Feels like Remeron is helping her sleep, does not sleep well at night without it. Is on wellbutrin for depression but it also helps with energy and overeating for her. No HI/SI.  Anxiety PORTIA-7  Feeling nervous, anxious or on edge: Not at all  Not being able to stop or control worrying: Several days  Worrying too much about different things: Several days  Trouble Relaxing: Several days  Being so restless that it is hard to sit still: Not at all  Becoming easily annoyed or irritable: Several days  Feeling afraid as if something awful might happen: Several days  PORTIA 7 Total Score: 5  If you checked any problems, how difficult have these problems made it for you to do your work, take care of things at home, or get along with other people: Somewhat difficult   PHQ-9 Depression Screening  Little interest or pleasure in doing things? 3-->nearly every day  Feeling down, depressed, or hopeless? 3-->nearly every day  Trouble falling or staying asleep, or sleeping too much? 3-->nearly every day  Feeling tired or having little energy? 3-->nearly every day  Poor appetite or overeating? 3-->nearly every day  Feeling bad about yourself - or that you are a failure or have let yourself or your family down? 3-->nearly every day  Trouble concentrating on things, such as reading the newspaper or watching television? 3-->nearly every day  Moving or speaking so slowly that other people could have noticed? Or the opposite - being so fidgety or restless that you have been moving around a lot more than  usual? 3-->nearly every day  Thoughts that you would be better off dead, or of hurting yourself in some way? 0-->not at all  PHQ-9 Total Score 24  If you checked off any problems, how difficult have these problems made it for you to do your work, take care of things at home, or get along with other people? somewhat difficult    Diet/exercise: has not been exercising, partially due to neuropathy and fear of falling  Eye exams: utd  Dental exams: utd    PHQ-2 Depression Screening  Little interest or pleasure in doing things? 3-->nearly every day   Feeling down, depressed, or hopeless? 3-->nearly every day   PHQ-2 Total Score 24       No LMP recorded. Patient has had a hysterectomy.   reports that she is not currently sexually active and has had partner(s) who are male. She reports using the following method of birth control/protection: Hysterectomy.  Cancer-related family history includes Brain cancer in her grandson; Breast cancer in her mother; Cancer in her brother, maternal grandfather, and mother; Colon cancer in her cousin. There is no history of Ovarian cancer.    Mammogram- 2022 due, fhx of breast CA  Pap- hysterectomy  Colonoscopy- 2021, repeat in 5 yr     reports that she has never smoked. She has never used smokeless tobacco.     Health Maintenance   Topic Date Due    ANNUAL PHYSICAL  Never done    ZOSTER VACCINE (2 of 2) 06/25/2024 (Originally 1/28/2020)    COVID-19 Vaccine (3 - 2023-24 season) 10/07/2024 (Originally 9/1/2023)    INFLUENZA VACCINE  08/01/2024    LIPID PANEL  10/12/2024    MAMMOGRAM  11/19/2024    BMI FOLLOWUP  06/25/2025    COLORECTAL CANCER SCREENING  10/01/2026    TDAP/TD VACCINES (2 - Td or Tdap) 05/08/2028    HEPATITIS C SCREENING  Completed    Pneumococcal Vaccine 0-64  Aged Out       Immunization History   Administered Date(s) Administered    COVID-19 (PFIZER) Purple Cap Monovalent 03/26/2021, 04/23/2021    Flu Vaccine Quad PF >36MO 1965, 10/10/2017, 10/11/2019, 11/30/2020     Flu Vaccine Split Quad 11/30/2020    Fluzone (or Fluarix & Flulaval for VFC) >6mos 11/30/2020, 12/13/2021, 12/20/2022    Fluzone Quad >6mos (Multi-dose) 1965, 10/10/2017, 10/11/2019    Hep A, Unspecified 09/26/2018, 04/26/2019    Hepatitis A 12/03/2019    Influenza, Unspecified 09/26/2018, 12/13/2021    Shingrix 12/03/2019    Tdap 05/08/2018         The following portions of the patient's history were reviewed and updated as appropriate: allergies, current medications, past family history, past medical history, past social history, past surgical history and problem list.    Patient Active Problem List   Diagnosis    At high risk for breast cancer    Hyperlipidemia    Essential hypertension    Hypothyroidism    Lumbar radiculopathy    Class 3 severe obesity due to excess calories without serious comorbidity with body mass index (BMI) of 50.0 to 59.9 in adult    Idiopathic peripheral neuropathy    Panic disorder    Recurrent major depression    Vitamin B12 deficiency (non anemic)    Vitamin D deficiency    Damon lesion of lung    Night sweat    Acute pain of right knee    Hip pain, right    Allergic rhinitis    Anxiety    Binge eating disorder    Acute recurrent sinusitis    Fatigue    At risk for sleep apnea    Rash    Abnormal kidney function    Prediabetes    Bilateral carpal tunnel syndrome    Insomnia    Peripheral sensory-motor axonal polyneuropathy    Routine general medical examination at a health care facility    Balance problem     Allergies   Allergen Reactions    Fish-Derived Products Itching    Lortab [Hydrocodone-Acetaminophen] Itching    Morphine GI Intolerance    Shellfish-Derived Products Itching       Current Outpatient Medications:     buPROPion XL (FORFIVO XL) 450 MG 24 hr tablet, TAKE 1 TABLET BY MOUTH DAILY, Disp: 30 tablet, Rfl: 0    fluticasone (FLONASE) 50 MCG/ACT nasal spray, 2 sprays into the nostril(s) as directed by provider Daily., Disp: , Rfl:     levothyroxine (SYNTHROID,  LEVOTHROID) 150 MCG tablet, TAKE 1 TABLET BY MOUTH DAILY, Disp: 30 tablet, Rfl: 0    mirtazapine (REMERON) 15 MG tablet, Take 1 tablet by mouth Every Night., Disp: 30 tablet, Rfl: 2    propranolol (INDERAL) 10 MG tablet, Take 1 tablet by mouth 2 (Two) Times a Day., Disp: , Rfl:     DULoxetine (Cymbalta) 30 MG capsule, Take 1 capsule by mouth Daily., Disp: 30 capsule, Rfl: 0    lisinopril (PRINIVIL,ZESTRIL) 10 MG tablet, Take 1 tablet by mouth Daily., Disp: 30 tablet, Rfl: 0    propranolol LA (Inderal LA) 80 MG 24 hr capsule, Take 1 capsule by mouth Daily., Disp: 30 capsule, Rfl: 0    Current Facility-Administered Medications:     cyanocobalamin injection 1,000 mcg, 1,000 mcg, Intramuscular, Q28 Days, Elsa Warner PA-C, 1,000 mcg at 06/25/24 1020  New Medications Ordered This Visit   Medications    DULoxetine (Cymbalta) 30 MG capsule     Sig: Take 1 capsule by mouth Daily.     Dispense:  30 capsule     Refill:  0    cyanocobalamin injection 1,000 mcg    lisinopril (PRINIVIL,ZESTRIL) 10 MG tablet     Sig: Take 1 tablet by mouth Daily.     Dispense:  30 tablet     Refill:  0    propranolol LA (Inderal LA) 80 MG 24 hr capsule     Sig: Take 1 capsule by mouth Daily.     Dispense:  30 capsule     Refill:  0     Past Medical History:   Diagnosis Date    Anxiety     Depression     Difficulty walking Last 14 months    Balance issues; steps challenging    Environmental allergies     Gall stones     HL (hearing loss)     Hyperlipidemia     Hypertension     Hypothyroidism     Neuropathy     Peripheral neuropathy 2014    Thyroid disease       Past Surgical History:   Procedure Laterality Date    BREAST BIOPSY Left     US guided    CHOLECYSTECTOMY      HYSTERECTOMY      TONSILLECTOMY      TUBAL ABDOMINAL LIGATION      ULNAR NERVE TRANSPOSITION Bilateral     x2    UMBILICAL HERNIA REPAIR        Family History   Problem Relation Age of Onset    Osteoporosis Mother     Arthritis Mother     Breast cancer Mother     Hypertension  "Mother     Hyperlipidemia Mother     Thyroid disease Mother     Dementia Mother         Dx with Ahlzheimers March 2022    Cancer Mother         Breast Cancer (twice)    Hearing loss Mother     Other Mother         Alzheimers    Arthritis Father     Hypertension Father     Kidney disease Father     Mental illness Father     Neuropathy Father     Aneurysm Father         AAA    Alcohol abuse Father     COPD Father     Depression Father     Arthritis Sister     Hypertension Sister     Hyperlipidemia Sister     Kidney disease Sister     Mental illness Sister     Obesity Sister     Osteoporosis Sister     Thyroid disease Sister     Depression Sister     Mental illness Sister     Hypertension Brother     Obesity Brother     Cancer Brother         Skin cancer (multiple occurrence)    Tuberculosis Maternal Grandmother     Cancer Maternal Grandfather         Lung cancer    Mental illness Paternal Grandmother     Stroke Paternal Grandmother     Heart attack Paternal Grandfather     Heart disease Paternal Grandfather     Colon cancer Cousin     Brain cancer Grandson     Ovarian cancer Neg Hx       Social History     Socioeconomic History    Marital status: Single   Tobacco Use    Smoking status: Never    Smokeless tobacco: Never   Vaping Use    Vaping status: Never Used   Substance and Sexual Activity    Alcohol use: Never    Drug use: Never    Sexual activity: Not Currently     Partners: Male     Birth control/protection: Hysterectomy        Objective   Vital Signs:   Vitals:    06/25/24 0848   BP: 126/80   Pulse: 54   Resp: 22   Temp: 97.6 °F (36.4 °C)   TempSrc: Temporal   SpO2: 95%   Weight: (!) 150 kg (331 lb)   Height: 170.2 cm (67.01\")   PainSc:   4   PainLoc: Foot      Body mass index is 51.83 kg/m².       Physical Exam  Vitals reviewed.   Constitutional:       General: She is not in acute distress.     Appearance: Normal appearance. She is obese. She is not ill-appearing.   HENT:      Head: Normocephalic and " atraumatic.      Right Ear: Tympanic membrane, ear canal and external ear normal.      Left Ear: Tympanic membrane, ear canal and external ear normal.      Mouth/Throat:      Mouth: Mucous membranes are moist.      Pharynx: No oropharyngeal exudate or posterior oropharyngeal erythema.   Eyes:      General: No scleral icterus.     Extraocular Movements: Extraocular movements intact.      Conjunctiva/sclera: Conjunctivae normal.      Pupils: Pupils are equal, round, and reactive to light.   Neck:      Thyroid: No thyromegaly.   Cardiovascular:      Rate and Rhythm: Normal rate and regular rhythm.      Pulses: Normal pulses.      Heart sounds: Normal heart sounds. No murmur heard.  Pulmonary:      Effort: Pulmonary effort is normal. No respiratory distress.      Breath sounds: Normal breath sounds. No stridor. No wheezing, rhonchi or rales.   Abdominal:      General: Bowel sounds are normal. There is no distension.      Palpations: Abdomen is soft. There is no mass.      Tenderness: There is no abdominal tenderness. There is no guarding.   Musculoskeletal:         General: No signs of injury. Normal range of motion.      Cervical back: Normal range of motion and neck supple.      Right lower leg: Edema (trace) present.      Left lower leg: Edema (trace) present.   Lymphadenopathy:      Cervical: No cervical adenopathy.   Skin:     General: Skin is warm and dry.      Coloration: Skin is not jaundiced.      Findings: No rash.   Neurological:      General: No focal deficit present.      Mental Status: She is alert and oriented to person, place, and time.      Gait: Gait normal.   Psychiatric:         Mood and Affect: Mood normal.         Behavior: Behavior normal.          Result Review :                   Assessment and Plan    Diagnoses and all orders for this visit:    1. Routine general medical examination at a health care facility (Primary)  -     Hemoglobin A1c; Future  -     Comprehensive Metabolic Panel; Future  -      Lipid Panel; Future  -     CBC Auto Differential; Future  -     TSH Rfx On Abnormal To Free T4; Future  -     Vitamin D,25-Hydroxy; Future  -     Vitamin B12; Future    2. Class 3 severe obesity due to excess calories without serious comorbidity with body mass index (BMI) of 50.0 to 59.9 in adult  Assessment & Plan:  Patient's (Body mass index is 51.83 kg/m².) indicates that they are morbidly/severely obese (BMI > 40 or > 35 with obesity - related health condition) with health conditions that include hypertension and dyslipidemias . Weight is worsening. BMI  is above average; BMI management plan is completed. We discussed portion control and increasing exercise.       3. Encounter for screening mammogram for breast cancer  -     Mammo Screening Digital Tomosynthesis Bilateral With CAD; Future    4. Acquired hypothyroidism  Assessment & Plan:  Continue Synthroid 150, will check labs today      5. Essential hypertension  Assessment & Plan:  Chronic, stable, will wean off propranolol to see if this helps with fatigue, rx propranolol LA 80, will plan to start lisinopril, rx small dose to add in the mean time if BP is elevated on lower dose of propranolol    Orders:  -     lisinopril (PRINIVIL,ZESTRIL) 10 MG tablet; Take 1 tablet by mouth Daily.  Dispense: 30 tablet; Refill: 0  -     propranolol LA (Inderal LA) 80 MG 24 hr capsule; Take 1 capsule by mouth Daily.  Dispense: 30 capsule; Refill: 0  -     CBC Auto Differential; Future  -     TSH Rfx On Abnormal To Free T4; Future    6. Prediabetes  Assessment & Plan:  Lab Results   Component Value Date    HGBA1C 6.00 (H) 10/12/2023   Cont to work on diet/exercise.     Orders:  -     Hemoglobin A1c; Future  -     Comprehensive Metabolic Panel; Future    7. Vitamin D deficiency  Assessment & Plan:  Check labs, may need vit D supplement      8. Vitamin B12 deficiency (non anemic)  Assessment & Plan:  Start B12 inj d/t neuropathy and fatigue with hx of low B12, can repeat in  2 wks at f/u. Will check B12 level    Orders:  -     cyanocobalamin injection 1,000 mcg  -     Vitamin B12; Future    9. Allergic rhinitis, unspecified seasonality, unspecified trigger    10. Mixed hyperlipidemia  Assessment & Plan:  Chronic, stable, continue working on diet and exercise, recheck FLP    Orders:  -     Lipid Panel; Future    11. Recurrent major depressive disorder, in partial remission  Assessment & Plan:  Chronic, uncontrolled, will change effexor to cymbalta, cont wellbutrin 450, cont remeron 15. Plan to inc cymbalta from 30 to 60 at f/u in 2wks.  Follow up in 2wks or sooner if has AE or worsening sx. Stop med and go to ER if has HI/SI. Risks and benefits of medical treatment discussed. Common side effects reviewed.  Adv to see therapist as well    Orders:  -     DULoxetine (Cymbalta) 30 MG capsule; Take 1 capsule by mouth Daily.  Dispense: 30 capsule; Refill: 0    12. Anxiety  Assessment & Plan:  Chronic, uncontrolled, will change effexor to cymbalta, cont wellbutrin 450, cont remeron 15. Plan to inc cymbalta from 30 to 60 at f/u in 2wks.  Follow up in 2wks or sooner if has AE or worsening sx. Stop med and go to ER if has HI/SI. Risks and benefits of medical treatment discussed. Common side effects reviewed.      Orders:  -     DULoxetine (Cymbalta) 30 MG capsule; Take 1 capsule by mouth Daily.  Dispense: 30 capsule; Refill: 0    13. Abnormal kidney function    14. Insomnia, unspecified type  Assessment & Plan:  Chronic, stable, cont remeron      15. Peripheral sensory-motor axonal polyneuropathy  Assessment & Plan:  Chronic, worsening, f/u with neuro as dir, will start B12 shots. Also discussed starting Lyrica but will wait until f/u visit. Refer to PT to help with balance    Orders:  -     Ambulatory Referral to Physical Therapy  -     Ambulatory Referral to Podiatry  -     DULoxetine (Cymbalta) 30 MG capsule; Take 1 capsule by mouth Daily.  Dispense: 30 capsule; Refill: 0    16. Balance  problem  -     Ambulatory Referral to Physical Therapy            Follow Up   Return in about 2 weeks (around 7/9/2024) for neuropathy, depression.    Counseled on health maintenance topics and preventative care recommendations. Follow up yearly for routine physical exam. Diet and exercise counseling given. See dentist and eye doctor yearly as directed.    If a referral was made please contact our office if you have not heard about an appointment in the next 2 weeks.   If labs or images are ordered we will contact you with the results within the next week.  If you have not heard from us after a week please call our office to inquire about the results.    Elsa Warner PA-C    Patient was given instructions and counseling regarding her condition or for health maintenance advice. Please see specific information pulled into the AVS if appropriate.     * Please note that portions of this note were completed with a voice recognition program.

## 2024-06-25 NOTE — ASSESSMENT & PLAN NOTE
Chronic, worsening, f/u with neuro as dir, will start B12 shots. Also discussed starting Lyrica but will wait until f/u visit. Refer to PT to help with balance

## 2024-06-25 NOTE — ASSESSMENT & PLAN NOTE
Patient's (Body mass index is 51.83 kg/m².) indicates that they are morbidly/severely obese (BMI > 40 or > 35 with obesity - related health condition) with health conditions that include hypertension and dyslipidemias . Weight is worsening. BMI  is above average; BMI management plan is completed. We discussed portion control and increasing exercise.

## 2024-06-25 NOTE — ASSESSMENT & PLAN NOTE
Chronic, stable, will wean off propranolol to see if this helps with fatigue, rx propranolol LA 80, will plan to start lisinopril, rx small dose to add in the mean time if BP is elevated on lower dose of propranolol

## 2024-06-25 NOTE — ASSESSMENT & PLAN NOTE
Chronic, uncontrolled, will change effexor to cymbalta, cont wellbutrin 450, cont remeron 15. Plan to inc cymbalta from 30 to 60 at f/u in 2wks.  Follow up in 2wks or sooner if has AE or worsening sx. Stop med and go to ER if has HI/SI. Risks and benefits of medical treatment discussed. Common side effects reviewed.  Adv to see therapist as well

## 2024-06-25 NOTE — ASSESSMENT & PLAN NOTE
Lab Results   Component Value Date    HGBA1C 6.00 (H) 10/12/2023   Cont to work on diet/exercise.

## 2024-06-25 NOTE — ASSESSMENT & PLAN NOTE
Start B12 inj d/t neuropathy and fatigue with hx of low B12, can repeat in 2 wks at f/u. Will check B12 level

## 2024-06-25 NOTE — ASSESSMENT & PLAN NOTE
Chronic, uncontrolled, will change effexor to cymbalta, cont wellbutrin 450, cont remeron 15. Plan to inc cymbalta from 30 to 60 at f/u in 2wks.  Follow up in 2wks or sooner if has AE or worsening sx. Stop med and go to ER if has HI/SI. Risks and benefits of medical treatment discussed. Common side effects reviewed.

## 2024-06-30 RX ORDER — ERGOCALCIFEROL 1.25 MG/1
50000 CAPSULE ORAL WEEKLY
Qty: 12 CAPSULE | Refills: 1 | Status: SHIPPED | OUTPATIENT
Start: 2024-06-30

## 2024-07-17 ENCOUNTER — TELEMEDICINE (OUTPATIENT)
Dept: INTERNAL MEDICINE | Facility: CLINIC | Age: 59
End: 2024-07-17
Payer: MEDICAID

## 2024-07-17 DIAGNOSIS — I10 ESSENTIAL HYPERTENSION: ICD-10-CM

## 2024-07-17 DIAGNOSIS — G60.9 IDIOPATHIC PERIPHERAL NEUROPATHY: Primary | ICD-10-CM

## 2024-07-17 DIAGNOSIS — F41.9 ANXIETY: ICD-10-CM

## 2024-07-17 DIAGNOSIS — R79.82 ELEVATED C-REACTIVE PROTEIN (CRP): ICD-10-CM

## 2024-07-17 DIAGNOSIS — R53.83 FATIGUE, UNSPECIFIED TYPE: ICD-10-CM

## 2024-07-17 DIAGNOSIS — R70.0 ELEVATED SED RATE: ICD-10-CM

## 2024-07-17 PROCEDURE — 99214 OFFICE O/P EST MOD 30 MIN: CPT | Performed by: PHYSICIAN ASSISTANT

## 2024-07-17 PROCEDURE — 1125F AMNT PAIN NOTED PAIN PRSNT: CPT | Performed by: PHYSICIAN ASSISTANT

## 2024-07-17 PROCEDURE — 1160F RVW MEDS BY RX/DR IN RCRD: CPT | Performed by: PHYSICIAN ASSISTANT

## 2024-07-17 PROCEDURE — 1159F MED LIST DOCD IN RCRD: CPT | Performed by: PHYSICIAN ASSISTANT

## 2024-07-17 RX ORDER — PROPRANOLOL HYDROCHLORIDE 10 MG/1
10 TABLET ORAL 3 TIMES DAILY PRN
Qty: 90 TABLET | Refills: 1 | Status: SHIPPED | OUTPATIENT
Start: 2024-07-17

## 2024-07-17 RX ORDER — PROPRANOLOL HCL 60 MG
60 CAPSULE, EXTENDED RELEASE 24HR ORAL DAILY
Qty: 30 CAPSULE | Refills: 0 | Status: SHIPPED | OUTPATIENT
Start: 2024-07-17

## 2024-07-17 RX ORDER — PREGABALIN 50 MG/1
50 CAPSULE ORAL 2 TIMES DAILY
Qty: 60 CAPSULE | Refills: 0 | Status: SHIPPED | OUTPATIENT
Start: 2024-07-17

## 2024-07-17 RX ORDER — DULOXETIN HYDROCHLORIDE 60 MG/1
60 CAPSULE, DELAYED RELEASE ORAL DAILY
Qty: 90 CAPSULE | Refills: 1 | Status: SHIPPED | OUTPATIENT
Start: 2024-07-17

## 2024-07-17 NOTE — PROGRESS NOTES
Mode of Visit: Video  Location of patient: Home address  Location of provider: [facility] office at [address], [place] [address]  You have chosen to receive care through a telehealth visit.  Do you consent to use a audio/video connection for your medical care today? Yes  This was an audio and video enabled telemedicine encounter.  No technical issues occurred during this visit.    Chief Complaint  Peripheral Neuropathy    Subjective          History of Present Illness  Jessica Ugalde presents to [facility] for   History of Present Illness  Idiopathic Peripheral polyneuropathy:  Has been eval by neurology ([facility] initially and  most recently). She had tried/failed neurontin in the past d/t AE and neuro suggested Lyrica but she was hesitant to start that. She has stabbing nerve pain and tingling in hands/feet.  Sx started 10 years ago with hand/feet tingling/pain. Feet are getting worse and she now does not feel the front of her feet. Her right leg also has numbness extending up the shin. Over the last year has had worsening weakness of legs and difficulty with .   Previously her hand neuropathy was resolved with cubital tunnel syndrome surgery but over the last several months it has returned and she has weakness and numbness in her hands in the first 3 fingers.   She was previously worked up by [facility] Neuro for peripheral neuropathy in her feet when sx started years ago, EMG was nl at that time. Has numbness on all of her toes and the bottom of her foot and also the top of her foot and up the shin some, worse on her right than the left. She does not feel pain or temperature on her feet. It does affect her balance, is nervous to go up and down stairs and walk around in public.    Hypothyroid:  Has been well controled on synthroid 150 for a long time.   Lab Results       Component                Value               Date                        TSH                      2.520               10/12/2023                            HTN:  We decreased propranolol last visit in hopes of helping with her fatigue, transitioning to lisinopril. She did accidentally stop the propranolol for a few days and her BP spiked so she is back on the lower dose and it is doing well.  No CP/SOA.    PreDM:  Lab Results       Component                Value               Date                       HGBA1C                   6.00 (H)            10/12/2023              Obesity:  She works from home and does not get out much, she has a hx of binge eating. She has been doing stability exercises that she found online. Is wanting to get out and exercise more but is worried about falling with her neuropathy.     Anx/Dep/insomnia:  Stopped Effexor and started Cymbalta last visit, doing well on that and would like it increased. Also on wellbutrin and remeron.  Feels like Remeron is helping her sleep, does not sleep well at night without it. Is on wellbutrin for depression but it also helps with energy and overeating for her. No HI/SI.  Depression  Presents for follow-up visit. Symptoms include confusion, hypersomnia, irritability, dizziness, malaise/fatigue and difficulty controlling mood. Patient is not experiencing: excessive worry, insomnia and obsessions.Symptoms occur constantly.  The severity of symptoms is severe.  The symptoms are aggravated by family issues and social activities. Her past medical history is significant for depression. Compliance with medications is %. Side effects of treatment include fatigue.       The following portions of the patient's history were reviewed and updated as appropriate: allergies, current medications, past family history, past medical history, past social history, past surgical history and problem list.    Allergies   Allergen Reactions    Fish-Derived Products Itching    Lortab [Hydrocodone-Acetaminophen] Itching    Morphine GI Intolerance     Shellfish-Derived Products Itching       Current Outpatient Medications:     propranolol (INDERAL) 10 MG tablet, Take 1 tablet by mouth 3 (Three) Times a Day As Needed (anxiety or tachycardia)., Disp: 90 tablet, Rfl: 1    propranolol LA (Inderal LA) 60 MG 24 hr capsule, Take 1 capsule by mouth Daily., Disp: 30 capsule, Rfl: 0    buPROPion XL (FORFIVO XL) 450 MG 24 hr tablet, Take 1 tablet by mouth Daily., Disp: 90 tablet, Rfl: 1    DULoxetine (CYMBALTA) 60 MG capsule, Take 1 capsule by mouth Daily., Disp: 90 capsule, Rfl: 1    fluticasone (FLONASE) 50 MCG/ACT nasal spray, 2 sprays into the nostril(s) as directed by provider Daily., Disp: , Rfl:     levothyroxine (SYNTHROID, LEVOTHROID) 150 MCG tablet, Take 1 tablet by mouth Daily., Disp: 90 tablet, Rfl: 1    lisinopril (PRINIVIL,ZESTRIL) 10 MG tablet, TAKE 1 TABLET BY MOUTH DAILY, Disp: 30 tablet, Rfl: 0    mirtazapine (REMERON) 15 MG tablet, Take 1 tablet by mouth Every Night., Disp: 90 tablet, Rfl: 1    pregabalin (Lyrica) 50 MG capsule, Take 1 capsule by mouth 2 (Two) Times a Day., Disp: 60 capsule, Rfl: 0    pyridoxine (VITAMIN B-6) 25 MG tablet, Take 1 tablet by mouth Daily., Disp: 90 each, Rfl: 1    vitamin D (ERGOCALCIFEROL) 1.25 MG (70181 UT) capsule capsule, Take 1 capsule by mouth 1 (One) Time Per Week., Disp: 12 capsule, Rfl: 1    Current Facility-Administered Medications:     cyanocobalamin injection 1,000 mcg, 1,000 mcg, Intramuscular, Q28 Days, Elsa Warner PA-C, 1,000 mcg at 06/25/24 1020  New Medications Ordered This Visit   Medications    DULoxetine (CYMBALTA) 60 MG capsule     Sig: Take 1 capsule by mouth Daily.     Dispense:  90 capsule     Refill:  1    propranolol (INDERAL) 10 MG tablet     Sig: Take 1 tablet by mouth 3 (Three) Times a Day As Needed (anxiety or tachycardia).     Dispense:  90 tablet     Refill:  1    propranolol LA (Inderal LA) 60 MG 24 hr capsule     Sig: Take 1 capsule by mouth Daily.     Dispense:  30 capsule     Refill:   0    pregabalin (Lyrica) 50 MG capsule     Sig: Take 1 capsule by mouth 2 (Two) Times a Day.     Dispense:  60 capsule     Refill:  0     Social History     Tobacco Use   Smoking Status Never   Smokeless Tobacco Never        Objective   There were no vitals filed for this visit.  There is no height or weight on file to calculate BMI.    Physical Exam   Constitutional: She appears well-developed and well-nourished. No distress.   HENT:   Head: Normocephalic and atraumatic.   Eyes: Conjunctivae and EOM are normal.   Neck: Neck normal appearance.  Pulmonary/Chest: Effort normal.  No respiratory distress. She no audible wheeze...  Neurological: She is alert.   Psychiatric: She has a normal mood and affect.   Vitals reviewed.      Result Review :        Assessment and Plan    Diagnoses and all orders for this visit:    1. Idiopathic peripheral neuropathy (Primary)  Assessment & Plan:  Chronic, worsening, check a few more labs, f/u with neuro as dir, refer to rheum for workup as well d/t hx of elevated inflammatory markers    Orders:  -     Ambulatory Referral to Rheumatology  -     pregabalin (Lyrica) 50 MG capsule; Take 1 capsule by mouth 2 (Two) Times a Day.  Dispense: 60 capsule; Refill: 0  -     Autoimmune Axonal Neuropathy Profile; Future  -     Iron Profile; Future  -     Ferritin; Future  -     Vitamin B6; Future  -     Urinalysis With Microscopic - Urine, Clean Catch; Future    2. Anxiety  Assessment & Plan:  Chronic, uncontrolled, will increase Cymbalta, cont wellbutrin 450, cont remeron 15.       Orders:  -     DULoxetine (CYMBALTA) 60 MG capsule; Take 1 capsule by mouth Daily.  Dispense: 90 capsule; Refill: 1  -     propranolol (INDERAL) 10 MG tablet; Take 1 tablet by mouth 3 (Three) Times a Day As Needed (anxiety or tachycardia).  Dispense: 90 tablet; Refill: 1  -     Autoimmune Axonal Neuropathy Profile; Future  -     Iron Profile; Future  -     Ferritin; Future  -     Vitamin B6; Future  -     Urinalysis  With Microscopic - Urine, Clean Catch; Future    3. Essential hypertension  Assessment & Plan:  Chronic, stable, will wean off propranolol to see if this helps with fatigue, rx propranolol LA 60, and start Lisinopril     Orders:  -     propranolol LA (Inderal LA) 60 MG 24 hr capsule; Take 1 capsule by mouth Daily.  Dispense: 30 capsule; Refill: 0  -     Autoimmune Axonal Neuropathy Profile; Future  -     Iron Profile; Future  -     Ferritin; Future  -     Vitamin B6; Future  -     Urinalysis With Microscopic - Urine, Clean Catch; Future    4. Fatigue, unspecified type  -     Ambulatory Referral to Rheumatology  -     Autoimmune Axonal Neuropathy Profile; Future  -     Iron Profile; Future  -     Ferritin; Future  -     Vitamin B6; Future  -     Urinalysis With Microscopic - Urine, Clean Catch; Future    5. Elevated C-reactive protein (CRP)  -     Ambulatory Referral to Rheumatology  -     Autoimmune Axonal Neuropathy Profile; Future  -     Iron Profile; Future  -     Ferritin; Future  -     Vitamin B6; Future  -     Urinalysis With Microscopic - Urine, Clean Catch; Future    6. Elevated sed rate  -     Ambulatory Referral to Rheumatology  -     Autoimmune Axonal Neuropathy Profile; Future  -     Iron Profile; Future  -     Ferritin; Future  -     Vitamin B6; Future  -     Urinalysis With Microscopic - Urine, Clean Catch; Future            Follow Up   No follow-ups on file.    Follow up if symptoms worsen or persist or has new or concerning symptoms, go to ER for severe symptoms.   I discussed my findings, recommendations, and plan of care with the patient. Reviewed common medication effects and side effects and to report side effects immediately. Encouraged medication compliance and the importance of keeping scheduled follow up appointments. Pt verbalized understanding and agreement.  If a referral was made please contact our office if you have not heard about an appointment in the next 2 weeks.   If labs or images  are ordered we will contact you with the results within the next week.  If you have not heard from us after a week please call our office to inquire about the results.     I have reviewed the limitations of a telehealth visit (such as lack of a physical exam and unable to obtain vital signs) and advised the patient that they may need to follow up for an office visit should their symptoms or concerns persist, worsen, or change.    Elsa Warner PA-C    * Please note that portions of this note were completed with a voice recognition program.

## 2024-07-18 ENCOUNTER — LAB (OUTPATIENT)
Dept: INTERNAL MEDICINE | Facility: CLINIC | Age: 59
End: 2024-07-18
Payer: MEDICAID

## 2024-07-18 ENCOUNTER — CLINICAL SUPPORT (OUTPATIENT)
Dept: INTERNAL MEDICINE | Facility: CLINIC | Age: 59
End: 2024-07-18
Payer: MEDICAID

## 2024-07-18 DIAGNOSIS — R70.0 ELEVATED SED RATE: ICD-10-CM

## 2024-07-18 DIAGNOSIS — I10 ESSENTIAL HYPERTENSION: ICD-10-CM

## 2024-07-18 DIAGNOSIS — E53.8 VITAMIN B12 DEFICIENCY (NON ANEMIC): Primary | ICD-10-CM

## 2024-07-18 DIAGNOSIS — R53.83 FATIGUE, UNSPECIFIED TYPE: ICD-10-CM

## 2024-07-18 DIAGNOSIS — G60.9 IDIOPATHIC PERIPHERAL NEUROPATHY: ICD-10-CM

## 2024-07-18 DIAGNOSIS — R79.82 ELEVATED C-REACTIVE PROTEIN (CRP): ICD-10-CM

## 2024-07-18 DIAGNOSIS — F41.9 ANXIETY: ICD-10-CM

## 2024-07-18 LAB
BACTERIA UR QL AUTO: ABNORMAL /HPF
BILIRUB UR QL STRIP: NEGATIVE
CLARITY UR: CLEAR
COLOR UR: YELLOW
FERRITIN SERPL-MCNC: 72.5 NG/ML (ref 13–150)
GLUCOSE UR STRIP-MCNC: NEGATIVE MG/DL
HGB UR QL STRIP.AUTO: NEGATIVE
HYALINE CASTS UR QL AUTO: ABNORMAL /LPF
IRON 24H UR-MRATE: 49 MCG/DL (ref 37–145)
IRON SATN MFR SERPL: 14 % (ref 20–50)
KETONES UR QL STRIP: NEGATIVE
LEUKOCYTE ESTERASE UR QL STRIP.AUTO: NEGATIVE
NITRITE UR QL STRIP: NEGATIVE
PH UR STRIP.AUTO: 6 [PH] (ref 5–8)
PROT UR QL STRIP: NEGATIVE
RBC # UR STRIP: ABNORMAL /HPF
REF LAB TEST METHOD: ABNORMAL
SP GR UR STRIP: 1.02 (ref 1–1.03)
SQUAMOUS #/AREA URNS HPF: ABNORMAL /HPF
TIBC SERPL-MCNC: 349 MCG/DL (ref 298–536)
TRANSFERRIN SERPL-MCNC: 234 MG/DL (ref 200–360)
UROBILINOGEN UR QL STRIP: NORMAL
WBC # UR STRIP: ABNORMAL /HPF

## 2024-07-18 PROCEDURE — 84207 ASSAY OF VITAMIN B-6: CPT | Performed by: PHYSICIAN ASSISTANT

## 2024-07-18 PROCEDURE — 36415 COLL VENOUS BLD VENIPUNCTURE: CPT | Performed by: PHYSICIAN ASSISTANT

## 2024-07-18 PROCEDURE — 83540 ASSAY OF IRON: CPT | Performed by: PHYSICIAN ASSISTANT

## 2024-07-18 PROCEDURE — 82728 ASSAY OF FERRITIN: CPT | Performed by: PHYSICIAN ASSISTANT

## 2024-07-18 PROCEDURE — 84466 ASSAY OF TRANSFERRIN: CPT | Performed by: PHYSICIAN ASSISTANT

## 2024-07-18 PROCEDURE — 96372 THER/PROPH/DIAG INJ SC/IM: CPT | Performed by: PHYSICIAN ASSISTANT

## 2024-07-18 PROCEDURE — 81001 URINALYSIS AUTO W/SCOPE: CPT | Performed by: PHYSICIAN ASSISTANT

## 2024-07-18 RX ADMIN — CYANOCOBALAMIN 1000 MCG: 1000 INJECTION, SOLUTION INTRAMUSCULAR; SUBCUTANEOUS at 08:06

## 2024-07-20 ENCOUNTER — HOSPITAL ENCOUNTER (OUTPATIENT)
Dept: MAMMOGRAPHY | Facility: HOSPITAL | Age: 59
Discharge: HOME | End: 2024-07-20
Admitting: PHYSICIAN ASSISTANT
Payer: MEDICAID

## 2024-07-20 DIAGNOSIS — Z12.31 ENCOUNTER FOR SCREENING MAMMOGRAM FOR BREAST CANCER: ICD-10-CM

## 2024-07-20 LAB
NCCN CRITERIA FLAG: ABNORMAL
TYRER CUZICK SCORE: 26.4

## 2024-07-20 PROCEDURE — 77063 BREAST TOMOSYNTHESIS BI: CPT

## 2024-07-20 PROCEDURE — 77067 SCR MAMMO BI INCL CAD: CPT

## 2024-07-21 DIAGNOSIS — F41.9 ANXIETY: ICD-10-CM

## 2024-07-21 DIAGNOSIS — F33.41 RECURRENT MAJOR DEPRESSIVE DISORDER, IN PARTIAL REMISSION: ICD-10-CM

## 2024-07-22 DIAGNOSIS — E03.9 ACQUIRED HYPOTHYROIDISM: ICD-10-CM

## 2024-07-22 DIAGNOSIS — F33.41 RECURRENT MAJOR DEPRESSIVE DISORDER, IN PARTIAL REMISSION: ICD-10-CM

## 2024-07-22 DIAGNOSIS — F41.9 ANXIETY: ICD-10-CM

## 2024-07-22 RX ORDER — VENLAFAXINE HYDROCHLORIDE 150 MG/1
150 CAPSULE, EXTENDED RELEASE ORAL DAILY
Qty: 30 CAPSULE | Refills: 0 | OUTPATIENT
Start: 2024-07-22

## 2024-07-22 RX ORDER — LEVOTHYROXINE SODIUM 0.15 MG/1
150 TABLET ORAL DAILY
Qty: 30 TABLET | Refills: 0 | Status: SHIPPED | OUTPATIENT
Start: 2024-07-22 | End: 2024-07-24 | Stop reason: SDUPTHER

## 2024-07-22 RX ORDER — BUPROPION HYDROCHLORIDE 450 MG/1
450 TABLET, FILM COATED, EXTENDED RELEASE ORAL DAILY
Qty: 30 TABLET | Refills: 0 | Status: SHIPPED | OUTPATIENT
Start: 2024-07-22 | End: 2024-07-24 | Stop reason: SDUPTHER

## 2024-07-22 NOTE — TELEPHONE ENCOUNTER
JOSÉ MANUEL REFILL REQUEST LEVOTHYROXINE  LAST REFILL 6/13/24  LAST VISIT 6/25/24    BUPROPION  LAST REFILL 6/13/24  NEXT VISIT 7/30/24

## 2024-07-23 DIAGNOSIS — Z91.89 AT HIGH RISK FOR BREAST CANCER: Primary | ICD-10-CM

## 2024-07-23 DIAGNOSIS — I10 ESSENTIAL HYPERTENSION: ICD-10-CM

## 2024-07-23 DIAGNOSIS — G60.8 PERIPHERAL SENSORY-MOTOR AXONAL POLYNEUROPATHY: ICD-10-CM

## 2024-07-23 DIAGNOSIS — F33.41 RECURRENT MAJOR DEPRESSIVE DISORDER, IN PARTIAL REMISSION: ICD-10-CM

## 2024-07-23 DIAGNOSIS — F41.9 ANXIETY: ICD-10-CM

## 2024-07-23 LAB
AMPAR1 AB SER QL IF: NEGATIVE
AMPAR2 AB SER QL IF: NEGATIVE
AMPHIPHYSIN AB SER QL: NEGATIVE
CASPR2 IGG SERPL QL IF: NEGATIVE
CV2 IGG SER QL IB: NEGATIVE
CV2 IGG SER-ACNC: NEGATIVE
DPPX IGG SERPL QL IF: NEGATIVE
GABABR AB SER QL IF: NEGATIVE
GAD65 IGG+IGM SER IA-SCNC: NEGATIVE NMOL/L
GLIAL NUC TYPE 1 AB SER QL IF: NEGATIVE
HU AB SER QL IF: NEGATIVE
HU2 AB SER QL IF: NEGATIVE
HU3 AB SER QL: NEGATIVE
IMP & REVIEW OF LAB RESULTS: NEGATIVE
IP3R 1 IGG SER QL IF: NEGATIVE
LGI1 IGG SERPL QL IF: NEGATIVE
MGLUR1 IGG SER QL IF: NEGATIVE
NMDAR1 AB SER QL IF: NEGATIVE
PCA-1 AB SER QL IF: NEGATIVE
PCA-2 AB SER QL IF: NEGATIVE
PCA-TR AB SER QL IF: NEGATIVE

## 2024-07-23 RX ORDER — DULOXETIN HYDROCHLORIDE 30 MG/1
30 CAPSULE, DELAYED RELEASE ORAL DAILY
Qty: 30 CAPSULE | Refills: 0 | OUTPATIENT
Start: 2024-07-23

## 2024-07-23 RX ORDER — PROPRANOLOL HYDROCHLORIDE 80 MG/1
80 CAPSULE, EXTENDED RELEASE ORAL DAILY
Qty: 30 CAPSULE | Refills: 0 | OUTPATIENT
Start: 2024-07-23

## 2024-07-23 RX ORDER — LISINOPRIL 10 MG/1
10 TABLET ORAL DAILY
Qty: 30 TABLET | Refills: 0 | Status: SHIPPED | OUTPATIENT
Start: 2024-07-23

## 2024-07-23 NOTE — TELEPHONE ENCOUNTER
REFILL REQUEST LISINOPRIL   LAST REFILL 6/25/24    DUPLICATE REQUEST DULOXETINE   LAST REFILL 7/17/24    DUPLICATE REQUEST PROPRANOLOL  LAST REFILL 7/17/24    LAST VISIT 7/17/24  NEXT VISIT 7/30/24

## 2024-07-24 DIAGNOSIS — G47.00 INSOMNIA, UNSPECIFIED TYPE: ICD-10-CM

## 2024-07-24 DIAGNOSIS — F33.41 RECURRENT MAJOR DEPRESSIVE DISORDER, IN PARTIAL REMISSION: ICD-10-CM

## 2024-07-24 DIAGNOSIS — F41.9 ANXIETY: ICD-10-CM

## 2024-07-24 DIAGNOSIS — E03.9 ACQUIRED HYPOTHYROIDISM: ICD-10-CM

## 2024-07-24 LAB — PYRIDOXAL PHOS SERPL-MCNC: 4 UG/L (ref 3.4–65.2)

## 2024-07-24 RX ORDER — MIRTAZAPINE 15 MG/1
15 TABLET, FILM COATED ORAL NIGHTLY
Qty: 90 TABLET | Refills: 1 | Status: SHIPPED | OUTPATIENT
Start: 2024-07-24

## 2024-07-24 RX ORDER — LEVOTHYROXINE SODIUM 0.15 MG/1
150 TABLET ORAL DAILY
Qty: 90 TABLET | Refills: 1 | Status: SHIPPED | OUTPATIENT
Start: 2024-07-24

## 2024-07-24 RX ORDER — BUPROPION HYDROCHLORIDE 450 MG/1
450 TABLET, FILM COATED, EXTENDED RELEASE ORAL DAILY
Qty: 90 TABLET | Refills: 1 | Status: SHIPPED | OUTPATIENT
Start: 2024-07-24

## 2024-07-26 ENCOUNTER — TELEPHONE (OUTPATIENT)
Dept: INTERNAL MEDICINE | Facility: CLINIC | Age: 59
End: 2024-07-26

## 2024-07-26 NOTE — TELEPHONE ENCOUNTER
Pt called regarding her Breast Mammo. She is concerned because she is losing her insurance on the 31st of July. She has reviewed the results and states if she is needing additional imaging, she will need this scheduled ASAP. She can go on weekends, if this is a better time frame.     Please advise.

## 2024-07-26 NOTE — TELEPHONE ENCOUNTER
Have [facility] call the [facility] at [phone] and see what scheduling options they have and consider going to other [facility] like [place] or even [place] or [place].   I am going to reach out to the scheduling person at the [facility] and see what we can do.

## 2024-07-27 RX ORDER — PYRIDOXINE HCL (VITAMIN B6) 25 MG
25 TABLET ORAL DAILY
Qty: 90 EACH | Refills: 1 | Status: SHIPPED | OUTPATIENT
Start: 2024-07-27

## 2024-07-29 NOTE — ASSESSMENT & PLAN NOTE
Chronic, stable, will wean off propranolol to see if this helps with fatigue, rx propranolol LA 60, and start Lisinopril

## 2024-07-29 NOTE — ASSESSMENT & PLAN NOTE
Chronic, worsening, check a few more labs, f/u with neuro as dir, refer to rheum for workup as well d/t hx of elevated inflammatory markers

## 2024-07-30 ENCOUNTER — TRANSCRIBE ORDERS (OUTPATIENT)
Dept: ADMINISTRATIVE | Facility: HOSPITAL | Age: 59
End: 2024-07-30
Payer: MEDICAID

## 2024-07-30 ENCOUNTER — HOSPITAL ENCOUNTER (OUTPATIENT)
Facility: HOSPITAL | Age: 59
Discharge: HOME | End: 2024-07-30
Payer: MEDICAID

## 2024-07-30 ENCOUNTER — TELEMEDICINE (OUTPATIENT)
Dept: INTERNAL MEDICINE | Facility: CLINIC | Age: 59
End: 2024-07-30
Payer: MEDICAID

## 2024-07-30 VITALS — SYSTOLIC BLOOD PRESSURE: 127 MMHG | DIASTOLIC BLOOD PRESSURE: 74 MMHG

## 2024-07-30 DIAGNOSIS — I10 ESSENTIAL HYPERTENSION: ICD-10-CM

## 2024-07-30 DIAGNOSIS — R92.8 ABNORMAL MAMMOGRAM: ICD-10-CM

## 2024-07-30 DIAGNOSIS — R92.8 ABNORMAL MAMMOGRAM: Primary | ICD-10-CM

## 2024-07-30 DIAGNOSIS — G60.9 IDIOPATHIC PERIPHERAL NEUROPATHY: Primary | ICD-10-CM

## 2024-07-30 DIAGNOSIS — E53.8 VITAMIN B12 DEFICIENCY (NON ANEMIC): ICD-10-CM

## 2024-07-30 PROCEDURE — 77066 DX MAMMO INCL CAD BI: CPT

## 2024-07-30 PROCEDURE — 99214 OFFICE O/P EST MOD 30 MIN: CPT | Performed by: PHYSICIAN ASSISTANT

## 2024-07-30 PROCEDURE — 77066 DX MAMMO INCL CAD BI: CPT | Performed by: RADIOLOGY

## 2024-07-30 PROCEDURE — 1125F AMNT PAIN NOTED PAIN PRSNT: CPT | Performed by: PHYSICIAN ASSISTANT

## 2024-07-30 PROCEDURE — 3074F SYST BP LT 130 MM HG: CPT | Performed by: PHYSICIAN ASSISTANT

## 2024-07-30 PROCEDURE — 1159F MED LIST DOCD IN RCRD: CPT | Performed by: PHYSICIAN ASSISTANT

## 2024-07-30 PROCEDURE — 76642 ULTRASOUND BREAST LIMITED: CPT

## 2024-07-30 PROCEDURE — 1160F RVW MEDS BY RX/DR IN RCRD: CPT | Performed by: PHYSICIAN ASSISTANT

## 2024-07-30 PROCEDURE — 3078F DIAST BP <80 MM HG: CPT | Performed by: PHYSICIAN ASSISTANT

## 2024-07-30 PROCEDURE — G0279 TOMOSYNTHESIS, MAMMO: HCPCS

## 2024-07-30 PROCEDURE — 76642 ULTRASOUND BREAST LIMITED: CPT | Performed by: RADIOLOGY

## 2024-07-30 PROCEDURE — 77062 BREAST TOMOSYNTHESIS BI: CPT | Performed by: RADIOLOGY

## 2024-07-30 RX ORDER — LISINOPRIL 20 MG/1
20 TABLET ORAL DAILY
Qty: 90 TABLET | Refills: 1 | Status: SHIPPED | OUTPATIENT
Start: 2024-07-30

## 2024-07-30 RX ORDER — CYANOCOBALAMIN 1000 UG/ML
1000 INJECTION, SOLUTION INTRAMUSCULAR; SUBCUTANEOUS
Qty: 10 ML | Refills: 1 | Status: SHIPPED | OUTPATIENT
Start: 2024-07-30

## 2024-07-30 RX ORDER — PREGABALIN 50 MG/1
50 CAPSULE ORAL 3 TIMES DAILY
Qty: 270 CAPSULE | Refills: 1 | Status: SHIPPED | OUTPATIENT
Start: 2024-07-30

## 2024-07-30 NOTE — PROGRESS NOTES
Mode of Visit: Video  Location of patient: Home address  Location of provider: [facility] office at [address], [place] [address]  You have chosen to receive care through a telehealth visit.  Do you consent to use a audio/video connection for your medical care today? Yes  This was an audio and video enabled telemedicine encounter.  No technical issues occurred during this visit.    Chief Complaint  Peripheral Neuropathy    Subjective          History of Present Illness  Jessica Ugalde presents to [facility] for   History of Present Illness  Idiopathic Peripheral polyneuropathy:  Has been eval by neurology ([facility] initially and  most recently). She had tried/failed neurontin in the past d/t AE and neuro suggested Lyrica but she was hesitant to start that. She has stabbing nerve pain and tingling in hands/feet.  Sx started 10 years ago with hand/feet tingling/pain. Feet are getting worse and she now does not feel the front of her feet. Her right leg also has numbness extending up the shin. Over the last year has had worsening weakness of legs and difficulty with .   Previously her hand neuropathy was resolved with cubital tunnel syndrome surgery but over the last several months it has returned and she has weakness and numbness in her hands in the first 3 fingers.   She was previously worked up by [facility] Neuro for peripheral neuropathy in her feet when sx started years ago, EMG was nl at that time. Has numbness on all of her toes and the bottom of her foot and also the top of her foot and up the shin some, worse on her right than the left. She does not feel pain or temperature on her feet. It does affect her balance, is nervous to go up and down stairs and walk around in public.    HTN:  We decreased propranolol last visit in hopes of helping with her fatigue. She has done well and would like to completely stop it and switch over to the  Lisinopril.  No CP/SOA.    Obesity:  She works from home and does not get out much, she has a hx of binge eating. She has been doing stability exercises that she found online. Is wanting to get out and exercise more but is worried about falling with her neuropathy.     Anx/Dep/insomnia:  Doing well on Cymbalta, Wellbutrin, Remeron.  Feels like Remeron is helping her sleep, does not sleep well at night without it. Is on wellbutrin for depression but it also helps with energy and overeating for her. No HI/SI.    Depression  Presents for follow-up visit. Symptoms include confusion, hypersomnia, irritability, dizziness, malaise/fatigue and difficulty controlling mood. Patient is not experiencing: excessive worry, insomnia and obsessions.Symptoms occur constantly.  The severity of symptoms is severe.  The symptoms are aggravated by family issues and social activities. Her past medical history is significant for depression. Compliance with medications is %. Side effects of treatment include fatigue.       The following portions of the patient's history were reviewed and updated as appropriate: allergies, current medications, past family history, past medical history, past social history, past surgical history and problem list.    Allergies   Allergen Reactions    Fish-Derived Products Itching    Lortab [Hydrocodone-Acetaminophen] Itching    Morphine GI Intolerance    Shellfish-Derived Products Itching       Current Outpatient Medications:     buPROPion XL (FORFIVO XL) 450 MG 24 hr tablet, Take 1 tablet by mouth Daily., Disp: 90 tablet, Rfl: 1    Cyanocobalamin (B-12 Compliance Injection) 1000 MCG/ML kit, Inject 1 mL as directed Every 30 (Thirty) Days., Disp: 1 kit, Rfl: 0    cyanocobalamin 1000 MCG/ML injection, Inject 1 mL into the appropriate muscle as directed by prescriber Every 28 (Twenty-Eight) Days., Disp: 10 mL, Rfl: 1    DULoxetine (CYMBALTA) 60 MG capsule, Take 1 capsule by mouth Daily., Disp: 90 capsule,  Rfl: 1    fluticasone (FLONASE) 50 MCG/ACT nasal spray, 2 sprays into the nostril(s) as directed by provider Daily., Disp: , Rfl:     levothyroxine (SYNTHROID, LEVOTHROID) 150 MCG tablet, Take 1 tablet by mouth Daily., Disp: 90 tablet, Rfl: 1    mirtazapine (REMERON) 15 MG tablet, Take 1 tablet by mouth Every Night., Disp: 90 tablet, Rfl: 1    Needles & Syringes misc, Use 1 each Every 30 (Thirty) Days., Disp: 12 each, Rfl: 0    propranolol (INDERAL) 10 MG tablet, Take 1 tablet by mouth 3 (Three) Times a Day As Needed (anxiety or tachycardia)., Disp: 90 tablet, Rfl: 1    pyridoxine (VITAMIN B-6) 25 MG tablet, Take 1 tablet by mouth Daily., Disp: 90 each, Rfl: 1    Syringe, Disposable, 1 ML misc, Use 1 each Every 30 (Thirty) Days., Disp: 12 each, Rfl: 1    vitamin D (ERGOCALCIFEROL) 1.25 MG (82771 UT) capsule capsule, Take 1 capsule by mouth 1 (One) Time Per Week., Disp: 12 capsule, Rfl: 1    lisinopril (PRINIVIL,ZESTRIL) 20 MG tablet, Take 1 tablet by mouth Daily., Disp: 90 tablet, Rfl: 1    pregabalin (Lyrica) 50 MG capsule, Take 1 capsule by mouth 3 (Three) Times a Day., Disp: 270 capsule, Rfl: 1    Current Facility-Administered Medications:     cyanocobalamin injection 1,000 mcg, 1,000 mcg, Intramuscular, Q28 Days, Elsa Warner PA-C, 1,000 mcg at 07/18/24 0806  New Medications Ordered This Visit   Medications    pregabalin (Lyrica) 50 MG capsule     Sig: Take 1 capsule by mouth 3 (Three) Times a Day.     Dispense:  270 capsule     Refill:  1    lisinopril (PRINIVIL,ZESTRIL) 20 MG tablet     Sig: Take 1 tablet by mouth Daily.     Dispense:  90 tablet     Refill:  1    cyanocobalamin 1000 MCG/ML injection     Sig: Inject 1 mL into the appropriate muscle as directed by prescriber Every 28 (Twenty-Eight) Days.     Dispense:  10 mL     Refill:  1    Syringe, Disposable, 1 ML misc     Sig: Use 1 each Every 30 (Thirty) Days.     Dispense:  12 each     Refill:  1     Social History     Tobacco Use   Smoking Status  Never   Smokeless Tobacco Never        Objective   Vitals:    07/30/24 1646   BP: 127/74     There is no height or weight on file to calculate BMI.    Physical Exam   Constitutional: She appears well-developed and well-nourished. No distress.   HENT:   Head: Normocephalic and atraumatic.   Eyes: Conjunctivae and EOM are normal.   Neck: Neck normal appearance.  Pulmonary/Chest: Effort normal.  No respiratory distress. She no audible wheeze...  Neurological: She is alert.   Psychiatric: She has a normal mood and affect.   Vitals reviewed.      Result Review :        Assessment and Plan    Diagnoses and all orders for this visit:    1. Idiopathic peripheral neuropathy (Primary)  Assessment & Plan:  Chronic, uncontrolled, start Lyrica as suggested by neurology, will increase as tolerated    Orders:  -     pregabalin (Lyrica) 50 MG capsule; Take 1 capsule by mouth 3 (Three) Times a Day.  Dispense: 270 capsule; Refill: 1    2. Essential hypertension  Assessment & Plan:  Chronic, stable, will go ahead and stop propranolol long acting and start lisinopril 20, can still take propranolol short acting as needed for anxiety    Orders:  -     lisinopril (PRINIVIL,ZESTRIL) 20 MG tablet; Take 1 tablet by mouth Daily.  Dispense: 90 tablet; Refill: 1    3. Vitamin B12 deficiency (non anemic)  Assessment & Plan:  Continue B12 shots    Orders:  -     pregabalin (Lyrica) 50 MG capsule; Take 1 capsule by mouth 3 (Three) Times a Day.  Dispense: 270 capsule; Refill: 1  -     lisinopril (PRINIVIL,ZESTRIL) 20 MG tablet; Take 1 tablet by mouth Daily.  Dispense: 90 tablet; Refill: 1  -     cyanocobalamin 1000 MCG/ML injection; Inject 1 mL into the appropriate muscle as directed by prescriber Every 28 (Twenty-Eight) Days.  Dispense: 10 mL; Refill: 1  -     Syringe, Disposable, 1 ML misc; Use 1 each Every 30 (Thirty) Days.  Dispense: 12 each; Refill: 1              Follow Up   Return in about 3 months (around 10/30/2024).    Follow up if  symptoms worsen or persist or has new or concerning symptoms, go to ER for severe symptoms.   I discussed my findings, recommendations, and plan of care with the patient. Reviewed common medication effects and side effects and to report side effects immediately. Encouraged medication compliance and the importance of keeping scheduled follow up appointments. Pt verbalized understanding and agreement.  If a referral was made please contact our office if you have not heard about an appointment in the next 2 weeks.   If labs or images are ordered we will contact you with the results within the next week.  If you have not heard from us after a week please call our office to inquire about the results.     I have reviewed the limitations of a telehealth visit (such as lack of a physical exam and unable to obtain vital signs) and advised the patient that they may need to follow up for an office visit should their symptoms or concerns persist, worsen, or change.    Elsa Warner PA-C    * Please note that portions of this note were completed with a voice recognition program.   Answers submitted by the patient for this visit:  Other (Submitted on 7/29/2024)  Please describe your symptoms.: +no new symptoms  Have you had these symptoms before?: No  How long have you been having these symptoms?: 1-4 days  Please list any medications you are currently taking for this condition.: None  Please describe any probable cause for these symptoms. : N/A  Primary Reason for Visit (Submitted on 7/29/2024)  What is the primary reason for your visit?: Other

## 2024-08-06 RX ORDER — CYANOCOBALAMIN, ISOPROPYL ALCOHOL 1000MCG/ML
1 KIT INJECTION
Qty: 1 KIT | Refills: 0 | Status: SHIPPED | OUTPATIENT
Start: 2024-08-06

## 2024-08-08 DIAGNOSIS — N63.11 MASS OF UPPER OUTER QUADRANT OF RIGHT BREAST: Primary | ICD-10-CM

## 2024-08-12 NOTE — ASSESSMENT & PLAN NOTE
Chronic, stable, will go ahead and stop propranolol long acting and start lisinopril 20, can still take propranolol short acting as needed for anxiety

## 2024-08-13 DIAGNOSIS — I10 ESSENTIAL HYPERTENSION: ICD-10-CM

## 2024-08-13 RX ORDER — PROPRANOLOL HCL 60 MG
60 CAPSULE, EXTENDED RELEASE 24HR ORAL DAILY
Qty: 30 CAPSULE | Refills: 0 | OUTPATIENT
Start: 2024-08-13

## 2024-08-13 NOTE — TELEPHONE ENCOUNTER
The original prescription was discontinued on 8/11/2024 by Elsa Warner PA-C. Renewing this prescription may not be appropriate.

## 2024-08-23 ENCOUNTER — HOSPITAL ENCOUNTER (OUTPATIENT)
Dept: MAMMOGRAPHY | Facility: HOSPITAL | Age: 59
Discharge: HOME | End: 2024-08-23
Payer: MEDICAID

## 2024-08-23 DIAGNOSIS — R92.8 ABNORMAL MAMMOGRAM: ICD-10-CM

## 2024-08-23 PROCEDURE — 25010000002 LIDOCAINE 1 % SOLUTION: Performed by: RADIOLOGY

## 2024-08-23 PROCEDURE — A4648 IMPLANTABLE TISSUE MARKER: HCPCS

## 2024-08-23 RX ORDER — LIDOCAINE HYDROCHLORIDE AND EPINEPHRINE 10; 10 MG/ML; UG/ML
10 INJECTION, SOLUTION INFILTRATION; PERINEURAL ONCE
Status: COMPLETED | OUTPATIENT
Start: 2024-08-23 | End: 2024-08-23

## 2024-08-23 RX ORDER — LIDOCAINE HYDROCHLORIDE 10 MG/ML
5 INJECTION, SOLUTION INFILTRATION; PERINEURAL ONCE
Status: COMPLETED | OUTPATIENT
Start: 2024-08-23 | End: 2024-08-23

## 2024-08-23 RX ADMIN — LIDOCAINE HYDROCHLORIDE,EPINEPHRINE BITARTRATE 6 ML: 10; .01 INJECTION, SOLUTION INFILTRATION; PERINEURAL at 11:14

## 2024-08-23 RX ADMIN — Medication 2 ML: at 11:14

## 2024-08-26 ENCOUNTER — TELEPHONE (OUTPATIENT)
Dept: MAMMOGRAPHY | Facility: HOSPITAL | Age: 59
End: 2024-08-26
Payer: MEDICAID

## 2024-08-26 LAB
CYTO UR: NORMAL
LAB AP CASE REPORT: NORMAL
LAB AP CLINICAL INFORMATION: NORMAL
LAB AP DIAGNOSIS COMMENT: NORMAL
PATH REPORT.FINAL DX SPEC: NORMAL
PATH REPORT.GROSS SPEC: NORMAL

## 2024-08-26 RX ORDER — PYRIDOXINE HCL (VITAMIN B6) 25 MG
25 TABLET ORAL DAILY
Qty: 90 TABLET | Refills: 3 | Status: SHIPPED | OUTPATIENT
Start: 2024-08-26

## 2024-08-26 NOTE — TELEPHONE ENCOUNTER
Patient notified of pathology results and recommendations. Verbalizes understanding. States having some discomfort, denies needing analgesics.  Denies signs and symptoms of infection.     Patient desires to research surgeon choice. Patient is to call back with decision; an appointment will be scheduled and they will be notified. Verbalizes understanding.

## 2024-09-03 ENCOUNTER — TELEPHONE (OUTPATIENT)
Dept: MAMMOGRAPHY | Facility: HOSPITAL | Age: 59
End: 2024-09-03
Payer: MEDICAID

## 2024-09-03 NOTE — TELEPHONE ENCOUNTER
Called patient to follow up on choice for surgical consult, left voicemail requesting patient return call.

## 2024-09-06 ENCOUNTER — TELEPHONE (OUTPATIENT)
Dept: MAMMOGRAPHY | Facility: HOSPITAL | Age: 59
End: 2024-09-06
Payer: MEDICAID

## 2024-09-06 NOTE — TELEPHONE ENCOUNTER
Attempted to call patient to follow up on surgeon choice, left message requesting patient return call.

## 2024-09-09 ENCOUNTER — TELEPHONE (OUTPATIENT)
Dept: MAMMOGRAPHY | Facility: HOSPITAL | Age: 59
End: 2024-09-09
Payer: MEDICAID

## 2024-09-11 ENCOUNTER — DOCUMENTATION (OUTPATIENT)
Dept: MAMMOGRAPHY | Facility: HOSPITAL | Age: 59
End: 2024-09-11
Payer: MEDICAID

## 2024-09-11 NOTE — PROGRESS NOTES
Sharita was notified to send patient a letter regarding follow-up recommendation of a surgical consult.

## 2024-10-01 RX ORDER — ERGOCALCIFEROL 1.25 MG/1
50000 CAPSULE, LIQUID FILLED ORAL WEEKLY
Qty: 12 CAPSULE | Refills: 1 | OUTPATIENT
Start: 2024-10-01

## 2024-10-20 ENCOUNTER — PATIENT MESSAGE (OUTPATIENT)
Dept: INTERNAL MEDICINE | Facility: CLINIC | Age: 59
End: 2024-10-20
Payer: MEDICAID

## 2024-10-21 RX ORDER — BUPROPION HYDROCHLORIDE 150 MG/1
150 TABLET ORAL DAILY
Qty: 90 TABLET | Refills: 1 | Status: SHIPPED | OUTPATIENT
Start: 2024-10-21

## 2024-10-21 RX ORDER — BUPROPION HYDROCHLORIDE 300 MG/1
300 TABLET ORAL DAILY
Qty: 90 TABLET | Refills: 1 | Status: SHIPPED | OUTPATIENT
Start: 2024-10-21

## 2025-01-12 DIAGNOSIS — E03.9 ACQUIRED HYPOTHYROIDISM: ICD-10-CM

## 2025-01-12 DIAGNOSIS — I10 ESSENTIAL HYPERTENSION: ICD-10-CM

## 2025-01-12 DIAGNOSIS — G47.00 INSOMNIA, UNSPECIFIED TYPE: ICD-10-CM

## 2025-01-12 DIAGNOSIS — F41.9 ANXIETY: ICD-10-CM

## 2025-01-12 DIAGNOSIS — E53.8 VITAMIN B12 DEFICIENCY (NON ANEMIC): ICD-10-CM

## 2025-01-15 RX ORDER — DULOXETIN HYDROCHLORIDE 60 MG/1
60 CAPSULE, DELAYED RELEASE ORAL DAILY
Qty: 90 CAPSULE | Refills: 1 | Status: SHIPPED | OUTPATIENT
Start: 2025-01-15

## 2025-01-15 RX ORDER — MIRTAZAPINE 15 MG/1
15 TABLET, FILM COATED ORAL NIGHTLY
Qty: 90 TABLET | Refills: 1 | Status: SHIPPED | OUTPATIENT
Start: 2025-01-15

## 2025-01-15 RX ORDER — LISINOPRIL 20 MG/1
20 TABLET ORAL DAILY
Qty: 90 TABLET | Refills: 1 | Status: SHIPPED | OUTPATIENT
Start: 2025-01-15

## 2025-01-15 RX ORDER — LEVOTHYROXINE SODIUM 150 UG/1
150 TABLET ORAL DAILY
Qty: 90 TABLET | Refills: 1 | Status: SHIPPED | OUTPATIENT
Start: 2025-01-15

## 2025-02-28 ENCOUNTER — PATIENT ROUNDING (BHMG ONLY) (OUTPATIENT)
Dept: URGENT CARE | Facility: CLINIC | Age: 60
End: 2025-02-28

## 2025-04-10 ENCOUNTER — TELEPHONE (OUTPATIENT)
Dept: INTERNAL MEDICINE | Facility: CLINIC | Age: 60
End: 2025-04-10

## 2025-04-10 NOTE — TELEPHONE ENCOUNTER
Ryley Alaniz, I noticed it is past time for your 6 month follow up diagnostic mammogram. Would you like me to order this so you can get it scheduled? I was not sure if you were able to get the insurance stuff straightened out yet. Hope you are doing well and let me know if you need anything.   --Elsa Ramírez has tried calling patient today and left office number to return call.

## 2025-07-25 RX ORDER — BUPROPION HYDROCHLORIDE 300 MG/1
TABLET ORAL
Qty: 90 TABLET | Refills: 0 | OUTPATIENT
Start: 2025-07-25

## 2025-07-25 RX ORDER — BUPROPION HYDROCHLORIDE 150 MG/1
TABLET ORAL
Qty: 90 TABLET | Refills: 0 | OUTPATIENT
Start: 2025-07-25